# Patient Record
Sex: FEMALE | Race: WHITE | NOT HISPANIC OR LATINO | URBAN - METROPOLITAN AREA
[De-identification: names, ages, dates, MRNs, and addresses within clinical notes are randomized per-mention and may not be internally consistent; named-entity substitution may affect disease eponyms.]

---

## 2018-02-27 ENCOUNTER — OUTPATIENT (OUTPATIENT)
Dept: OUTPATIENT SERVICES | Facility: HOSPITAL | Age: 38
LOS: 1 days | Discharge: HOME | End: 2018-02-27

## 2018-02-27 DIAGNOSIS — R53.83 OTHER FATIGUE: ICD-10-CM

## 2018-02-27 DIAGNOSIS — E55.9 VITAMIN D DEFICIENCY, UNSPECIFIED: ICD-10-CM

## 2020-10-11 ENCOUNTER — TRANSCRIPTION ENCOUNTER (OUTPATIENT)
Age: 40
End: 2020-10-11

## 2021-01-27 ENCOUNTER — OUTPATIENT (OUTPATIENT)
Dept: OUTPATIENT SERVICES | Facility: HOSPITAL | Age: 41
LOS: 1 days | Discharge: HOME | End: 2021-01-27
Payer: MEDICAID

## 2021-01-27 DIAGNOSIS — Z12.31 ENCOUNTER FOR SCREENING MAMMOGRAM FOR MALIGNANT NEOPLASM OF BREAST: ICD-10-CM

## 2021-01-27 PROCEDURE — 77063 BREAST TOMOSYNTHESIS BI: CPT | Mod: 26

## 2021-01-27 PROCEDURE — 77067 SCR MAMMO BI INCL CAD: CPT | Mod: 26

## 2022-07-01 ENCOUNTER — INPATIENT (INPATIENT)
Facility: HOSPITAL | Age: 42
LOS: 2 days | Discharge: HOME | End: 2022-07-04
Attending: INTERNAL MEDICINE | Admitting: INTERNAL MEDICINE

## 2022-07-01 DIAGNOSIS — Z87.891 PERSONAL HISTORY OF NICOTINE DEPENDENCE: ICD-10-CM

## 2022-07-01 DIAGNOSIS — K80.20 CALCULUS OF GALLBLADDER WITHOUT CHOLECYSTITIS WITHOUT OBSTRUCTION: ICD-10-CM

## 2022-07-01 DIAGNOSIS — R73.03 PREDIABETES: ICD-10-CM

## 2022-07-01 DIAGNOSIS — Z79.84 LONG TERM (CURRENT) USE OF ORAL HYPOGLYCEMIC DRUGS: ICD-10-CM

## 2022-07-01 DIAGNOSIS — U07.1 COVID-19: ICD-10-CM

## 2022-07-01 DIAGNOSIS — I10 ESSENTIAL (PRIMARY) HYPERTENSION: ICD-10-CM

## 2022-07-01 DIAGNOSIS — K76.0 FATTY (CHANGE OF) LIVER, NOT ELSEWHERE CLASSIFIED: ICD-10-CM

## 2022-07-01 DIAGNOSIS — E66.9 OBESITY, UNSPECIFIED: ICD-10-CM

## 2022-07-01 DIAGNOSIS — Z90.5 ACQUIRED ABSENCE OF KIDNEY: ICD-10-CM

## 2022-07-01 DIAGNOSIS — R10.11 RIGHT UPPER QUADRANT PAIN: ICD-10-CM

## 2022-07-01 PROCEDURE — 99285 EMERGENCY DEPT VISIT HI MDM: CPT

## 2022-07-02 VITALS
RESPIRATION RATE: 18 BRPM | HEART RATE: 85 BPM | HEIGHT: 66 IN | WEIGHT: 199.96 LBS | SYSTOLIC BLOOD PRESSURE: 192 MMHG | TEMPERATURE: 98 F | OXYGEN SATURATION: 99 % | DIASTOLIC BLOOD PRESSURE: 117 MMHG

## 2022-07-02 LAB
ALBUMIN SERPL ELPH-MCNC: 4.1 G/DL — SIGNIFICANT CHANGE UP (ref 3.5–5.2)
ALP SERPL-CCNC: 146 U/L — HIGH (ref 30–115)
ALT FLD-CCNC: 435 U/L — HIGH (ref 0–41)
ANION GAP SERPL CALC-SCNC: 12 MMOL/L — SIGNIFICANT CHANGE UP (ref 7–14)
APPEARANCE UR: CLEAR — SIGNIFICANT CHANGE UP
AST SERPL-CCNC: 374 U/L — HIGH (ref 0–41)
BASOPHILS # BLD AUTO: 0.05 K/UL — SIGNIFICANT CHANGE UP (ref 0–0.2)
BASOPHILS NFR BLD AUTO: 0.5 % — SIGNIFICANT CHANGE UP (ref 0–1)
BILIRUB SERPL-MCNC: 1.2 MG/DL — SIGNIFICANT CHANGE UP (ref 0.2–1.2)
BILIRUB UR-MCNC: NEGATIVE — SIGNIFICANT CHANGE UP
BUN SERPL-MCNC: 14 MG/DL — SIGNIFICANT CHANGE UP (ref 10–20)
CALCIUM SERPL-MCNC: 9 MG/DL — SIGNIFICANT CHANGE UP (ref 8.5–10.1)
CHLORIDE SERPL-SCNC: 103 MMOL/L — SIGNIFICANT CHANGE UP (ref 98–110)
CO2 SERPL-SCNC: 24 MMOL/L — SIGNIFICANT CHANGE UP (ref 17–32)
COLOR SPEC: SIGNIFICANT CHANGE UP
CREAT SERPL-MCNC: 0.9 MG/DL — SIGNIFICANT CHANGE UP (ref 0.7–1.5)
DIFF PNL FLD: NEGATIVE — SIGNIFICANT CHANGE UP
EGFR: 82 ML/MIN/1.73M2 — SIGNIFICANT CHANGE UP
EOSINOPHIL # BLD AUTO: 0.08 K/UL — SIGNIFICANT CHANGE UP (ref 0–0.7)
EOSINOPHIL NFR BLD AUTO: 0.8 % — SIGNIFICANT CHANGE UP (ref 0–8)
FLUAV AG NPH QL: SIGNIFICANT CHANGE UP
FLUBV AG NPH QL: SIGNIFICANT CHANGE UP
GLUCOSE SERPL-MCNC: 172 MG/DL — HIGH (ref 70–99)
GLUCOSE UR QL: NEGATIVE — SIGNIFICANT CHANGE UP
HCG SERPL QL: NEGATIVE — SIGNIFICANT CHANGE UP
HCT VFR BLD CALC: 36.7 % — LOW (ref 37–47)
HGB BLD-MCNC: 12.7 G/DL — SIGNIFICANT CHANGE UP (ref 12–16)
IMM GRANULOCYTES NFR BLD AUTO: 0.7 % — HIGH (ref 0.1–0.3)
KETONES UR-MCNC: NEGATIVE — SIGNIFICANT CHANGE UP
LACTATE SERPL-SCNC: 1.9 MMOL/L — SIGNIFICANT CHANGE UP (ref 0.7–2)
LEUKOCYTE ESTERASE UR-ACNC: NEGATIVE — SIGNIFICANT CHANGE UP
LIDOCAIN IGE QN: 69 U/L — HIGH (ref 7–60)
LYMPHOCYTES # BLD AUTO: 2.32 K/UL — SIGNIFICANT CHANGE UP (ref 1.2–3.4)
LYMPHOCYTES # BLD AUTO: 23.7 % — SIGNIFICANT CHANGE UP (ref 20.5–51.1)
MCHC RBC-ENTMCNC: 31.4 PG — HIGH (ref 27–31)
MCHC RBC-ENTMCNC: 34.6 G/DL — SIGNIFICANT CHANGE UP (ref 32–37)
MCV RBC AUTO: 90.8 FL — SIGNIFICANT CHANGE UP (ref 81–99)
MONOCYTES # BLD AUTO: 0.75 K/UL — HIGH (ref 0.1–0.6)
MONOCYTES NFR BLD AUTO: 7.7 % — SIGNIFICANT CHANGE UP (ref 1.7–9.3)
NEUTROPHILS # BLD AUTO: 6.53 K/UL — HIGH (ref 1.4–6.5)
NEUTROPHILS NFR BLD AUTO: 66.6 % — SIGNIFICANT CHANGE UP (ref 42.2–75.2)
NITRITE UR-MCNC: NEGATIVE — SIGNIFICANT CHANGE UP
NRBC # BLD: 0 /100 WBCS — SIGNIFICANT CHANGE UP (ref 0–0)
PH UR: 7 — SIGNIFICANT CHANGE UP (ref 5–8)
PLATELET # BLD AUTO: 336 K/UL — SIGNIFICANT CHANGE UP (ref 130–400)
POTASSIUM SERPL-MCNC: 3.8 MMOL/L — SIGNIFICANT CHANGE UP (ref 3.5–5)
POTASSIUM SERPL-SCNC: 3.8 MMOL/L — SIGNIFICANT CHANGE UP (ref 3.5–5)
PROT SERPL-MCNC: 6.4 G/DL — SIGNIFICANT CHANGE UP (ref 6–8)
PROT UR-MCNC: NEGATIVE — SIGNIFICANT CHANGE UP
RBC # BLD: 4.04 M/UL — LOW (ref 4.2–5.4)
RBC # FLD: 12.5 % — SIGNIFICANT CHANGE UP (ref 11.5–14.5)
RSV RNA NPH QL NAA+NON-PROBE: SIGNIFICANT CHANGE UP
SARS-COV-2 RNA SPEC QL NAA+PROBE: DETECTED
SODIUM SERPL-SCNC: 139 MMOL/L — SIGNIFICANT CHANGE UP (ref 135–146)
SP GR SPEC: 1.01 — LOW (ref 1.01–1.03)
TROPONIN T SERPL-MCNC: <0.01 NG/ML — SIGNIFICANT CHANGE UP
UROBILINOGEN FLD QL: SIGNIFICANT CHANGE UP
WBC # BLD: 9.8 K/UL — SIGNIFICANT CHANGE UP (ref 4.8–10.8)
WBC # FLD AUTO: 9.8 K/UL — SIGNIFICANT CHANGE UP (ref 4.8–10.8)

## 2022-07-02 PROCEDURE — 74176 CT ABD & PELVIS W/O CONTRAST: CPT | Mod: 26,MA

## 2022-07-02 PROCEDURE — 99223 1ST HOSP IP/OBS HIGH 75: CPT

## 2022-07-02 PROCEDURE — 76705 ECHO EXAM OF ABDOMEN: CPT | Mod: 26

## 2022-07-02 PROCEDURE — 93010 ELECTROCARDIOGRAM REPORT: CPT

## 2022-07-02 PROCEDURE — 99233 SBSQ HOSP IP/OBS HIGH 50: CPT

## 2022-07-02 RX ORDER — SODIUM CHLORIDE 9 MG/ML
2800 INJECTION, SOLUTION INTRAVENOUS ONCE
Refills: 0 | Status: DISCONTINUED | OUTPATIENT
Start: 2022-07-02 | End: 2022-07-02

## 2022-07-02 RX ORDER — METFORMIN HYDROCHLORIDE 850 MG/1
1 TABLET ORAL
Qty: 0 | Refills: 0 | DISCHARGE

## 2022-07-02 RX ORDER — SODIUM CHLORIDE 9 MG/ML
1000 INJECTION, SOLUTION INTRAVENOUS ONCE
Refills: 0 | Status: COMPLETED | OUTPATIENT
Start: 2022-07-02 | End: 2022-07-02

## 2022-07-02 RX ORDER — SODIUM CHLORIDE 9 MG/ML
1000 INJECTION INTRAMUSCULAR; INTRAVENOUS; SUBCUTANEOUS
Refills: 0 | Status: DISCONTINUED | OUTPATIENT
Start: 2022-07-02 | End: 2022-07-04

## 2022-07-02 RX ORDER — IBUPROFEN 200 MG
400 TABLET ORAL EVERY 6 HOURS
Refills: 0 | Status: DISCONTINUED | OUTPATIENT
Start: 2022-07-02 | End: 2022-07-04

## 2022-07-02 RX ORDER — ENOXAPARIN SODIUM 100 MG/ML
40 INJECTION SUBCUTANEOUS EVERY 24 HOURS
Refills: 0 | Status: DISCONTINUED | OUTPATIENT
Start: 2022-07-02 | End: 2022-07-04

## 2022-07-02 RX ORDER — KETOROLAC TROMETHAMINE 30 MG/ML
15 SYRINGE (ML) INJECTION EVERY 6 HOURS
Refills: 0 | Status: DISCONTINUED | OUTPATIENT
Start: 2022-07-02 | End: 2022-07-04

## 2022-07-02 RX ORDER — ONDANSETRON 8 MG/1
4 TABLET, FILM COATED ORAL ONCE
Refills: 0 | Status: COMPLETED | OUTPATIENT
Start: 2022-07-02 | End: 2022-07-02

## 2022-07-02 RX ORDER — LOSARTAN POTASSIUM 100 MG/1
1 TABLET, FILM COATED ORAL
Qty: 0 | Refills: 0 | DISCHARGE

## 2022-07-02 RX ORDER — MORPHINE SULFATE 50 MG/1
4 CAPSULE, EXTENDED RELEASE ORAL ONCE
Refills: 0 | Status: DISCONTINUED | OUTPATIENT
Start: 2022-07-02 | End: 2022-07-02

## 2022-07-02 RX ORDER — LANOLIN ALCOHOL/MO/W.PET/CERES
3 CREAM (GRAM) TOPICAL AT BEDTIME
Refills: 0 | Status: DISCONTINUED | OUTPATIENT
Start: 2022-07-02 | End: 2022-07-04

## 2022-07-02 RX ORDER — ONDANSETRON 8 MG/1
4 TABLET, FILM COATED ORAL EVERY 6 HOURS
Refills: 0 | Status: DISCONTINUED | OUTPATIENT
Start: 2022-07-02 | End: 2022-07-04

## 2022-07-02 RX ORDER — PANTOPRAZOLE SODIUM 20 MG/1
40 TABLET, DELAYED RELEASE ORAL
Refills: 0 | Status: DISCONTINUED | OUTPATIENT
Start: 2022-07-02 | End: 2022-07-03

## 2022-07-02 RX ADMIN — MORPHINE SULFATE 4 MILLIGRAM(S): 50 CAPSULE, EXTENDED RELEASE ORAL at 01:24

## 2022-07-02 RX ADMIN — ONDANSETRON 4 MILLIGRAM(S): 8 TABLET, FILM COATED ORAL at 01:24

## 2022-07-02 RX ADMIN — SODIUM CHLORIDE 75 MILLILITER(S): 9 INJECTION INTRAMUSCULAR; INTRAVENOUS; SUBCUTANEOUS at 23:34

## 2022-07-02 RX ADMIN — SODIUM CHLORIDE 1000 MILLILITER(S): 9 INJECTION, SOLUTION INTRAVENOUS at 01:24

## 2022-07-02 NOTE — H&P ADULT - NSHPPHYSICALEXAM_GEN_ALL_CORE
General: NAD, lying in bed comfortably  Mental status: AAOx3  Head:  Atraumatic, Normocephalic  Eyes: EOMI, conjunctiva and sclera clear  ENT: Moist mucous membranes  Neck: No JVD  Chest/lung: Clear to auscultation bilaterally; No wheezing or crackles  Heart: Regular rate and rhythm; No murmurs, rubs, or gallops  Abdomen: Bowel sounds present; Soft, Nondistended. No tenderness RUQ  Extremities:  2+ Peripheral Pulses. No edema or cyanosis

## 2022-07-02 NOTE — ED PROVIDER NOTE - NS ED ATTENDING STATEMENT MOD
----- Message from Urbano Ku MD sent at 9/18/2019  6:20 PM CDT -----  Patient can be notified results are within normal limits and no adjustments or corrections need to be made at this time.   Attending with

## 2022-07-02 NOTE — ED PROVIDER NOTE - PROGRESS NOTE DETAILS
ED Attending ARIES Laura  Patient and family aware of all results, alk phos 146, , , lipase 69, ultrasound showing cholelithiasis with a dilated CBD of 7 mm, recommendation for MRCP.  Patient aware of plan for admission for GI consult and further evaluation and treatment.  Agrees with plan.  Medical admitting team aware of patient and admission.  Report will get GI consulted.  Admitted as discussed with patient.

## 2022-07-02 NOTE — ED PROVIDER NOTE - OBJECTIVE STATEMENT
42 yo female with no pmh presenting to the ED for abdominal pain. States that it started a few days ago, resolved, then returned today - this time worse. Pain is localized to the RUQ, not relieved by any medications. States that she has never had pain like this before. Also states she tested positive for covid two weeks ago. Denies eating any new foods.

## 2022-07-02 NOTE — PATIENT PROFILE ADULT - FALL HARM RISK - HARM RISK INTERVENTIONS

## 2022-07-02 NOTE — ED PROVIDER NOTE - CARE PLAN
Assessment and plan of treatment:	Plan: Labs, ivf, pain control, urine, imaging, reassess.   1 Principal Discharge DX:	Abdominal pain  Assessment and plan of treatment:	Plan: Labs, ivf, pain control, urine, imaging, reassess.  Secondary Diagnosis:	Dilation of common bile duct

## 2022-07-02 NOTE — H&P ADULT - ASSESSMENT
41F with medical history of L nephrectomy for kidney donation, pre diabetes, tested positive for COVID outpatient 2 weeks (mild infection with cough only did not need further medical care, vaccinated and boosted) ago presents with RUQ abdominal pain x1 day, found to have cholelithiasis, CBD dilation, and elevated liver enzymes.    #Cholelithiasis, suspected choledocholithiasis  - CTAP noncon: CBD 8 mm, hepatic steatosis, s/p left nephrectomy, no acute pathology  - Afebrile, no leukocytosis No evidence of cholecystitis or cholangitis  - MRCP  - Pain control PRN (currently pain free) 41F with medical history of L nephrectomy for kidney donation, pre diabetes, tested positive for COVID outpatient 2 weeks (mild infection with cough only did not need further medical care, vaccinated and boosted) ago presents with RUQ abdominal pain x1 day, found to have cholelithiasis, CBD dilation, and elevated liver enzymes.    #Cholelithiasis, suspected choledocholithiasis  - RUQ US: Cholelithiasis, no cholecystitis  - CTAP noncon: CBD 8 mm, hepatic steatosis, s/p left nephrectomy, no acute pathology  - Afebrile, no leukocytosis No evidence of cholecystitis or cholangitis. Lipase mildly elevated, no evidence of gallstone pancreatitis  - MRCP  - NSAIDs PRN (currently pain free)  - NPO for now pending MRCP. If MRCP delayed, can feed as tolerated  - GI consult    #Transaminitis  - Alk phos, ALT, AST elevation in a hepatocellular pattern; T bili normal  - Possibly component of choledocholithiasis + COVID hepatitis  - Check viral hepatitis panel, f/u GI    #COVID-19 infection, asymptomatic  - Likely resolved, tested positive 2 weeks ago  - No indication for treatment, monitor    #HTN  #Pre-DM  - Resume home losartan 50 mg  - If FS persistently elevated can start basal bolus insulin    #Hx of left nephrectomy- outpt f/u    DVT Prophylaxis: Lovenox   GI Prophylaxis: Protonix PO  Diet: NPO for now   41F with medical history of L nephrectomy for kidney donation, pre diabetes, tested positive for COVID outpatient 2 weeks (mild infection with cough only did not need further medical care, vaccinated and boosted) ago presents with RUQ abdominal pain x1 day, found to have cholelithiasis, CBD dilation, and elevated liver enzymes.    #Cholelithiasis, suspected choledocholithiasis  - RUQ US: Cholelithiasis, no cholecystitis  - CTAP noncon: CBD 8 mm, hepatic steatosis, s/p left nephrectomy, no acute pathology  - Afebrile, no leukocytosis No evidence of cholecystitis or cholangitis. Lipase mildly elevated, no evidence of gallstone pancreatitis  - MRCP  - NSAIDs PRN (currently pain free)  - NPO for now pending MRCP. If MRCP delayed, can feed as tolerated  - GI consult    #Transaminitis  - Alk phos, ALT, AST elevation in a hepatocellular pattern; T bili normal  - Possibly component of choledocholithiasis + viral hepatitis (recent COVID infection)  - Check viral hepatitis panel, f/u GI    #COVID-19 infection, asymptomatic  - Likely resolved, tested positive 2 weeks ago  - No indication for treatment, monitor    #HTN  #Pre-DM  - Resume home losartan 50 mg  - If FS persistently elevated can start basal bolus insulin    #Hx of left nephrectomy- outpt f/u    DVT Prophylaxis: Lovenox   GI Prophylaxis: Protonix PO  Diet: NPO for now

## 2022-07-02 NOTE — CONSULT NOTE ADULT - SUBJECTIVE AND OBJECTIVE BOX
Gastroenterology Consultation:    Patient is a 41y old  Female who presents with a chief complaint of Abdominal pain (02 Jul 2022 11:49)      Admitted on: 07-02-22  HPI:  41F with medical history of L nephrectomy for kidney donation to her mother 17 y ago, pre diabetes, tested positive for COVID outpatient 2 weeks (mild infection with cough only did not need further medical care, vaccinated and boosted) ago presents with RUQ abdominal pain since last night. Pain is sharp/crampy, constant, no relieving or exacerbating factors, not related to food intake. She had the same pain on Wednesday, resolved by itself, then recurred last night and did not resolve until she received morphine in the hospital.     She denies history of gallbladder stones or similar pain, nausea, vomiting, fever, chills, pruritus, yellowing of the skin or eyes, back pain, diarrhea or constipation, chest pain, SOB, cough, drug or herbal supplement use.    ED VS: T(F): , Max: 98.2  HR:  (77 - 85) BP:  (129/82 - 166/77) RR: 18 SpO2:  (97% - 99%)    Labs: Alk phos 146, , , T bili 1.2, lipase 69. COVID PCR + RUQ US Cholelithiasis without sonographic evidence for acute cholecystitis. Dilated CBD 7 mm  CTAP noncon: CBD 8 mm, hepatic steatosis, s/p left nephrectomy, no acute pathology    pain resolved completely after morphine     Prior EGD: never   Prior Colonoscopy: never       PAST MEDICAL & SURGICAL HISTORY:  S/p nephrectomy  HTN (hypertension)  DM on metfromin     FAMILY HISTORY: kidney failure mother. CCY mother       Social History:  Tobacco: n  Alcohol: n  Drugs: n    Home Medications:  losartan 50 mg oral tablet: 1 tab(s) orally once a day (02 Jul 2022 11:37)  metFORMIN 500 mg oral tablet: 1 tab(s) orally once a day (02 Jul 2022 11:37)    MEDICATIONS  (STANDING):  enoxaparin Injectable 40 milliGRAM(s) SubCutaneous every 24 hours  pantoprazole    Tablet 40 milliGRAM(s) Oral before breakfast  sodium chloride 0.9%. 1000 milliLiter(s) (75 mL/Hr) IV Continuous <Continuous>    MEDICATIONS  (PRN):  ibuprofen  Tablet. 400 milliGRAM(s) Oral every 6 hours PRN Moderate Pain (4 - 6)  ketorolac   Injectable 15 milliGRAM(s) IV Push every 6 hours PRN Severe Pain (7 - 10)  melatonin 3 milliGRAM(s) Oral at bedtime PRN Insomnia  ondansetron Injectable 4 milliGRAM(s) IV Push every 6 hours PRN Nausea and/or Vomiting      Allergies  No Known Allergies      Review of Systems:   Constitutional:  No Fever, No Chills  ENT/Mouth:  No Hearing Changes,  No Difficulty Swallowing  Eyes:  No Eye Pain, No Vision Changes  Cardiovascular:  No Chest Pain, No Palpitations  Respiratory:  No Cough, No Dyspnea  Gastrointestinal:  As described in HPI  Musculoskeletal:  No Joint Swelling, No Back Pain  Skin:  No Skin Lesions, No Jaundice  Neuro:  No Syncope, No Dizziness  Heme/Lymph:  No Bruising, No Bleeding.     Physical Examination:  T(C): 36.4 (07-02-22 @ 08:50), Max: 36.8 (07-02-22 @ 00:06)  HR: 77 (07-02-22 @ 08:50) (77 - 85)  BP: 129/82 (07-02-22 @ 08:50) (129/82 - 166/77)  RR: 18 (07-02-22 @ 08:50) (18 - 18)  SpO2: 97% (07-02-22 @ 08:50) (97% - 99%)  Height (cm): 167.6 (07-02-22 @ 00:06)  Weight (kg): 90.7 (07-02-22 @ 00:06)      Constitutional: No acute distress.  Eyes:. Conjunctivae are clear, Sclera is non-icteric.  Ears Nose and Throat: The external ears are normal appearing,  Oral mucosa is pink and moist.  Respiratory:  No signs of respiratory distress. Lung sounds are clear bilaterally.  Cardiovascular:  S1 S2, Regular rate and rhythm.  GI: Abdomen is soft, symmetric, and non-tender without distention.   Neuro: AO*3, no asterixis  Skin: No rashes, No Jaundice.     Data: (reviewed by attending)                        12.7   9.80  )-----------( 336      ( 02 Jul 2022 01:10 )             36.7     Hgb Trend:  12.7  07-02-22 @ 01:10      07-02    139  |  103  |  14  ----------------------------<  172<H>  3.8   |  24  |  0.9    Ca    9.0      02 Jul 2022 01:10    TPro  6.4  /  Alb  4.1  /  TBili  1.2  /  DBili  x   /  AST  374<H>  /  ALT  435<H>  /  AlkPhos  146<H>  07-02    Liver panel trend:  TBili 1.2   /      /      /   AlkP 146   /   Tptn 6.4   /   Alb 4.1    /   DBili --      07-02              Radiology:(reviewed by attending)  CT Abdomen and Pelvis No Cont:   ACC: [id number] EXAM:  CT ABDOMEN AND PELVIS                          PROCEDURE DATE:  07/02/2022          INTERPRETATION:  CLINICAL STATEMENT: Abdominal pain.    TECHNIQUE: Contiguous axial CT images were obtained from the lower chest   to the pubicsymphysis without intravenous contrast.  Oral contrast was   not administered.  Reformatted images in the coronal and sagittal planes   were acquired.    COMPARISON CT: CT abdomen pelvis dated 7/9/2006      FINDINGS:    LOWER CHEST: Mild bibasilar subsegmental atelectasis.    HEPATOBILIARY: Hepatic steatosis. Dilated CBD to 8 mm.    SPLEEN: Unremarkable.    PANCREAS: Unremarkable.    ADRENAL GLANDS: Unremarkable.    KIDNEYS: Post left nephrectomy. No right hydronephrosis.    ABDOMINOPELVIC NODES:Unremarkable.    PELVIC ORGANS: Unremarkable.    PERITONEUM/MESENTERY/BOWEL: Normal appendix. No bowel obstruction,   ascites or pneumoperitoneum.    BONES/SOFT TISSUES: No acute osseous abnormality.      IMPRESSION:    CBD dilatation to 8 mm. Can consider MRCP for further evaluation.    Hepatic steatosis.    Otherwise no CT evidence for acute intra-abdominal pathology.    --- End of Report ---          ISABELA FUNK MD; Resident Radiologist  This document has been electronically signed.  DEMETRA ESCOBAR MD; Attending Radiologist  This document has been electronically signed. Jul 2 2022  7:58AM (07-02-22 @ 03:57)    US Abdomen Upper Quadrant Right:   ACC: [id number] EXAM:  US ABDOMEN RT UPR QUADRANT                          PROCEDURE DATE:  07/02/2022          INTERPRETATION:  CLINICAL INFORMATION: Right upper quadrant abdominal   pain.    COMPARISON: None available.    TECHNIQUE: Sonography of the right upper quadrant.    FINDINGS:    Liver: Increased echogenicity.  Bile ducts: No intrahepatic biliary ductal dilatation. Common bile duct   measures 7 mm.  Gallbladder: Cholelithiasis. No appreciable gallbladder wall thickening,   pericholecystic fluid or reported sonographic Valdes's sign..  Pancreas: Visualized portions are within normal limits.  Right kidney: 13.8 cm. No hydronephrosis.  Ascites: None.  IVC: Visualized portions are within normal limits.    IMPRESSION:    Cholelithiasis without sonographic evidence for acute cholecystitis.    Dilated common bile duct measures 7 mm. Can consider MRCP for further   evaluation.    Hepatic steatosis.    --- End of Report ---          ISABELA FUNK MD; Resident Radiologist  This document hasbeen electronically signed.  DEMETRA ESCOBAR MD; Attending Radiologist  This document has been electronically signed. Jul 2 2022  4:00AM (07-02-22 @ 02:39)

## 2022-07-02 NOTE — ED PROVIDER NOTE - PHYSICAL EXAMINATION
CONSTITUTIONAL: Well-developed; well-nourished; pt appears uncomfortable, in pain.  SKIN: warm, dry. no rashes.  HEAD: Normocephalic; atraumatic.  EYES: PERRLA, EOMI, no conjunctival erythema.  ENT: No nasal discharge; airway clear. mmm.  NECK: Supple; non tender.  CARD: S1, S2 normal; no murmurs, gallops, or rubs. Regular rate and rhythm.   RESP: No wheezes, rales or rhonchi. lungs ctab.  ABD: soft, non-distended, +ttp to RUQ. +guarding. no CVAT b/l.  EXT: Normal ROM.  No clubbing, cyanosis or edema.  NEURO: Alert, oriented, grossly unremarkable. no focal neuro. deficits.

## 2022-07-02 NOTE — H&P ADULT - NSHPLABSRESULTS_GEN_ALL_CORE
LABS:  cret                        12.7   9.80  )-----------( 336      ( 02 Jul 2022 01:10 )             36.7     07-02    139  |  103  |  14  ----------------------------<  172<H>  3.8   |  24  |  0.9    Ca    9.0      02 Jul 2022 01:10    TPro  6.4  /  Alb  4.1  /  TBili  1.2  /  DBili  x   /  AST  374<H>  /  ALT  435<H>  /  AlkPhos  146<H>  07-02

## 2022-07-02 NOTE — CONSULT NOTE ADULT - ASSESSMENT
41F with medical history of L nephrectomy for kidney donation to her mother 17 y ago, pre diabetes, tested positive for COVID outpatient 2 weeks (mild infection with cough only did not need further medical care, vaccinated and boosted) ago presents with RUQ abdominal pain since last night. Pain is sharp/crampy, constant, no relieving or exacerbating factors, not related to food intake. She had the same pain on Wednesday, resolved by itself, then recurred last night and did not resolve until she received morphine in the hospital.     *Cholelithiasis  *Elevated Lfts,  hepatocellular pattern with R factor 8.4  -no fever no chills   -pain resolved   -RUQ US Cholelithiasis without sonographic evidence for acute cholecystitis. Dilated CBD 7 mm  -CTAP noncon: CBD 8 mm, hepatic steatosis, s/p left nephrectomy, no acute pathology    *COVID-19 positive  -per medicine: no indication for covid treatment---continue isolation precautions- monitor closely       Rec  -can have clear liquid diet  -MRCP  -Please check HAV IgM/IgG, HBsAg, HBsAb, HBcAb IgM/IgG, HCV Ab   -trend LFTs, check INR  -IV hydration  -pain control as needed  --For questions text me on [facility] teams, or call GI service at [phone] 41F with medical history of L nephrectomy for kidney donation to her mother 17 y ago, pre diabetes, tested positive for COVID outpatient 2 weeks (mild infection with cough only did not need further medical care, vaccinated and boosted) ago presents with RUQ abdominal pain since last night. Pain is sharp/crampy, constant, no relieving or exacerbating factors, not related to food intake. She had the same pain on Wednesday, resolved by itself, then recurred last night and did not resolve until she received morphine in the hospital.     *Cholelithiasis  *Elevated Lfts,  hepatocellular pattern with R factor 8.4  -no fever no chills   -pain resolved   -RUQ US Cholelithiasis without sonographic evidence for acute cholecystitis. Dilated CBD 7 mm  -CTAP noncon: CBD 8 mm, hepatic steatosis, s/p left nephrectomy, no acute pathology    *COVID-19 positive  -per medicine: no indication for covid treatment---continue isolation precautions- monitor closely       Rec  -can have clear liquid diet  -MRCP  -Please check HAV IgM/IgG, HBsAg, HBsAb, HBcAb IgM/IgG, HCV Ab   -trend LFTs, check INR  -IV hydration  -pain control as needed  -surgery evaluation    --For questions text me on Mc teams, or call GI service at [phone]

## 2022-07-02 NOTE — H&P ADULT - HISTORY OF PRESENT ILLNESS
41F with medical history of L nephrectomy for kidney donation, pre diabetes, tested positive for COVID outpatient 2 weeks ago presents with RUQ abdominal pain since last night. Pain is sharp, constant, no relieving or exacerbating factors, not related to food intake.     ED VS: T(F): , Max: 98.2  HR:  (77 - 85) BP:  (129/82 - 166/77) RR: 18 SpO2:  (97% - 99%)    Labs: Alk phos 146, , , T bili 1.2, lipase 69. COVID PCR + RUQ US Cholelithiasis without sonographic evidence for acute cholecystitis. Dilated CBD 7 mm  CTAP noncon: CBD 8 mm, hepatic steatosis, s/p left nephrectomy, no acute pathology    Pt received   Pt is admitted for workup/management   41F with medical history of L nephrectomy for kidney donation to her mother 17 y ago, pre diabetes, tested positive for COVID outpatient 2 weeks (mild infection with cough only did not need further medical care, vaccinated and boosted) ago presents with RUQ abdominal pain since last night. Pain is sharp/crampy, constant, no relieving or exacerbating factors, not related to food intake. She had the same pain on Wednesday, resolved by itself, then recurred last night and did not resolve until she received morphine in the hospital.     She denies history of gallbladder stones or similar pain, nausea, vomiting, fever, chills, pruritus, yellowing of the skin or eyes, back pain, diarrhea or constipation, chest pain, SOB, cough, drug or herbal supplement use.    ED VS: T(F): , Max: 98.2  HR:  (77 - 85) BP:  (129/82 - 166/77) RR: 18 SpO2:  (97% - 99%)    Labs: Alk phos 146, , , T bili 1.2, lipase 69. COVID PCR + RUQ US Cholelithiasis without sonographic evidence for acute cholecystitis. Dilated CBD 7 mm  CTAP noncon: CBD 8 mm, hepatic steatosis, s/p left nephrectomy, no acute pathology    Pt received LR bolus, morphine with resolution of pain  Pt is admitted for workup/management of cholecystitis/suspected choledocholithiasis

## 2022-07-02 NOTE — H&P ADULT - NSHPREVIEWOFSYSTEMS_GEN_ALL_CORE
CONSTITUTIONAL: No weakness, fevers or chills, no weight loss   EYES/ENT: No visual changes;  No vertigo or throat pain   NECK: No pain or stiffness  RESPIRATORY: No cough, wheezing, hemoptysis; No shortness of breath  CARDIOVASCULAR: No chest pain or palpitations  GASTROINTESTINAL: + RUQ pain, No nausea, vomiting, or hematemesis; No diarrhea or constipation. No melena or hematochezia.  GENITOURINARY: No dysuria, frequency or hematuria  NEUROLOGICAL: No numbness or weakness  All other review of systems is negative unless indicated above.

## 2022-07-02 NOTE — H&P ADULT - ATTENDING COMMENTS
patient seen and examined independently on morning rounds for the first time today in the ED, chart reviewed and discussed with the medicine resident and on interdisciplinary rounds and agree with the above overnight h/p with the following addendum:    in brief, 42 yo woman with h/o nephrectomy (donated kidney to mother many years ago) and recent covid infection (2 weeks ago) who p/w RUQ abdominal pain and found to have cholelithiasis with dilated cbd on US.  no h/o recent travel or sick contacts.  by the time I performed exam abdominal pain was improving LMP 6/19/22-no h/o etoh or drug use- no recent travel    mother present bedside  psurg hx-  nephrectomy    ROS- as per above otherwise review of systems unremarkable    PE:  GEN-NAD, AAOx3  HEENT- NCAT, PERRLA, EOMI  PULM- Clear to auscultation bilaterally, fair air entry  CVS- +s1/s2 RRR no murmurs  GI- soft NT ND +bs, no rebound, no guarding  EXT- no edema    labs/radiology reviewed--lipase 69  covid +    a/p:  #abdominal pain- cholelithiasis with dilated CBD and transaminitis  #covid-19 infection  -lipase 69  -pain control  -GI consult  -pending MRCP  -trend lft and check viral hepatitis plan  -no indication for covid treatment---continue isolation precautions- monitor closely  -check hba1c    DVT/GI ppx  guarded prognosis    FULL CODE    anticipate likely dc home after mrcp completed within next 24-48 hrs

## 2022-07-03 LAB
ALBUMIN SERPL ELPH-MCNC: 4.1 G/DL — SIGNIFICANT CHANGE UP (ref 3.5–5.2)
ALP SERPL-CCNC: 164 U/L — HIGH (ref 30–115)
ALT FLD-CCNC: 437 U/L — HIGH (ref 0–41)
ANION GAP SERPL CALC-SCNC: 14 MMOL/L — SIGNIFICANT CHANGE UP (ref 7–14)
AST SERPL-CCNC: 192 U/L — HIGH (ref 0–41)
BASOPHILS # BLD AUTO: 0.05 K/UL — SIGNIFICANT CHANGE UP (ref 0–0.2)
BASOPHILS NFR BLD AUTO: 0.6 % — SIGNIFICANT CHANGE UP (ref 0–1)
BILIRUB SERPL-MCNC: 1 MG/DL — SIGNIFICANT CHANGE UP (ref 0.2–1.2)
BUN SERPL-MCNC: 7 MG/DL — LOW (ref 10–20)
CALCIUM SERPL-MCNC: 8.8 MG/DL — SIGNIFICANT CHANGE UP (ref 8.5–10.1)
CHLORIDE SERPL-SCNC: 105 MMOL/L — SIGNIFICANT CHANGE UP (ref 98–110)
CO2 SERPL-SCNC: 21 MMOL/L — SIGNIFICANT CHANGE UP (ref 17–32)
CREAT SERPL-MCNC: 0.6 MG/DL — LOW (ref 0.7–1.5)
CULTURE RESULTS: SIGNIFICANT CHANGE UP
EGFR: 115 ML/MIN/1.73M2 — SIGNIFICANT CHANGE UP
EOSINOPHIL # BLD AUTO: 0.12 K/UL — SIGNIFICANT CHANGE UP (ref 0–0.7)
EOSINOPHIL NFR BLD AUTO: 1.4 % — SIGNIFICANT CHANGE UP (ref 0–8)
GLUCOSE SERPL-MCNC: 112 MG/DL — HIGH (ref 70–99)
HAV IGM SER-ACNC: SIGNIFICANT CHANGE UP
HBV CORE IGM SER-ACNC: SIGNIFICANT CHANGE UP
HBV SURFACE AG SER-ACNC: SIGNIFICANT CHANGE UP
HCT VFR BLD CALC: 38.4 % — SIGNIFICANT CHANGE UP (ref 37–47)
HCV AB S/CO SERPL IA: 0.14 S/CO — SIGNIFICANT CHANGE UP (ref 0–0.99)
HCV AB SERPL-IMP: SIGNIFICANT CHANGE UP
HGB BLD-MCNC: 13.2 G/DL — SIGNIFICANT CHANGE UP (ref 12–16)
IMM GRANULOCYTES NFR BLD AUTO: 0.5 % — HIGH (ref 0.1–0.3)
INR BLD: 1.51 RATIO — HIGH (ref 0.65–1.3)
LYMPHOCYTES # BLD AUTO: 2.13 K/UL — SIGNIFICANT CHANGE UP (ref 1.2–3.4)
LYMPHOCYTES # BLD AUTO: 24.5 % — SIGNIFICANT CHANGE UP (ref 20.5–51.1)
MAGNESIUM SERPL-MCNC: 1.8 MG/DL — SIGNIFICANT CHANGE UP (ref 1.8–2.4)
MCHC RBC-ENTMCNC: 31.3 PG — HIGH (ref 27–31)
MCHC RBC-ENTMCNC: 34.4 G/DL — SIGNIFICANT CHANGE UP (ref 32–37)
MCV RBC AUTO: 91 FL — SIGNIFICANT CHANGE UP (ref 81–99)
MONOCYTES # BLD AUTO: 0.67 K/UL — HIGH (ref 0.1–0.6)
MONOCYTES NFR BLD AUTO: 7.7 % — SIGNIFICANT CHANGE UP (ref 1.7–9.3)
NEUTROPHILS # BLD AUTO: 5.68 K/UL — SIGNIFICANT CHANGE UP (ref 1.4–6.5)
NEUTROPHILS NFR BLD AUTO: 65.3 % — SIGNIFICANT CHANGE UP (ref 42.2–75.2)
NRBC # BLD: 0 /100 WBCS — SIGNIFICANT CHANGE UP (ref 0–0)
PLATELET # BLD AUTO: 347 K/UL — SIGNIFICANT CHANGE UP (ref 130–400)
POTASSIUM SERPL-MCNC: 4.1 MMOL/L — SIGNIFICANT CHANGE UP (ref 3.5–5)
POTASSIUM SERPL-SCNC: 4.1 MMOL/L — SIGNIFICANT CHANGE UP (ref 3.5–5)
PROT SERPL-MCNC: 6.3 G/DL — SIGNIFICANT CHANGE UP (ref 6–8)
PROTHROM AB SERPL-ACNC: 17.3 SEC — HIGH (ref 9.95–12.87)
RBC # BLD: 4.22 M/UL — SIGNIFICANT CHANGE UP (ref 4.2–5.4)
RBC # FLD: 12.9 % — SIGNIFICANT CHANGE UP (ref 11.5–14.5)
SODIUM SERPL-SCNC: 140 MMOL/L — SIGNIFICANT CHANGE UP (ref 135–146)
SPECIMEN SOURCE: SIGNIFICANT CHANGE UP
WBC # BLD: 8.69 K/UL — SIGNIFICANT CHANGE UP (ref 4.8–10.8)
WBC # FLD AUTO: 8.69 K/UL — SIGNIFICANT CHANGE UP (ref 4.8–10.8)

## 2022-07-03 PROCEDURE — 74181 MRI ABDOMEN W/O CONTRAST: CPT | Mod: 26

## 2022-07-03 PROCEDURE — 99233 SBSQ HOSP IP/OBS HIGH 50: CPT

## 2022-07-03 RX ORDER — ACETAMINOPHEN 500 MG
650 TABLET ORAL ONCE
Refills: 0 | Status: COMPLETED | OUTPATIENT
Start: 2022-07-03 | End: 2022-07-03

## 2022-07-03 RX ORDER — PANTOPRAZOLE SODIUM 20 MG/1
40 TABLET, DELAYED RELEASE ORAL ONCE
Refills: 0 | Status: COMPLETED | OUTPATIENT
Start: 2022-07-03 | End: 2022-07-03

## 2022-07-03 RX ADMIN — ENOXAPARIN SODIUM 40 MILLIGRAM(S): 100 INJECTION SUBCUTANEOUS at 06:56

## 2022-07-03 RX ADMIN — Medication 650 MILLIGRAM(S): at 11:45

## 2022-07-03 RX ADMIN — PANTOPRAZOLE SODIUM 40 MILLIGRAM(S): 20 TABLET, DELAYED RELEASE ORAL at 08:51

## 2022-07-03 RX ADMIN — Medication 650 MILLIGRAM(S): at 10:33

## 2022-07-04 ENCOUNTER — TRANSCRIPTION ENCOUNTER (OUTPATIENT)
Age: 42
End: 2022-07-04

## 2022-07-04 VITALS
DIASTOLIC BLOOD PRESSURE: 85 MMHG | OXYGEN SATURATION: 100 % | TEMPERATURE: 98 F | RESPIRATION RATE: 18 BRPM | HEART RATE: 80 BPM | SYSTOLIC BLOOD PRESSURE: 165 MMHG

## 2022-07-04 LAB
ALBUMIN SERPL ELPH-MCNC: 4.1 G/DL — SIGNIFICANT CHANGE UP (ref 3.5–5.2)
ALP SERPL-CCNC: 136 U/L — HIGH (ref 30–115)
ALT FLD-CCNC: 282 U/L — HIGH (ref 0–41)
ANION GAP SERPL CALC-SCNC: 11 MMOL/L — SIGNIFICANT CHANGE UP (ref 7–14)
AST SERPL-CCNC: 61 U/L — HIGH (ref 0–41)
BASOPHILS # BLD AUTO: 0.05 K/UL — SIGNIFICANT CHANGE UP (ref 0–0.2)
BASOPHILS NFR BLD AUTO: 0.5 % — SIGNIFICANT CHANGE UP (ref 0–1)
BILIRUB SERPL-MCNC: 0.6 MG/DL — SIGNIFICANT CHANGE UP (ref 0.2–1.2)
BUN SERPL-MCNC: 10 MG/DL — SIGNIFICANT CHANGE UP (ref 10–20)
CALCIUM SERPL-MCNC: 9.3 MG/DL — SIGNIFICANT CHANGE UP (ref 8.5–10.1)
CHLORIDE SERPL-SCNC: 106 MMOL/L — SIGNIFICANT CHANGE UP (ref 98–110)
CO2 SERPL-SCNC: 25 MMOL/L — SIGNIFICANT CHANGE UP (ref 17–32)
CREAT SERPL-MCNC: 0.7 MG/DL — SIGNIFICANT CHANGE UP (ref 0.7–1.5)
EGFR: 111 ML/MIN/1.73M2 — SIGNIFICANT CHANGE UP
EOSINOPHIL # BLD AUTO: 0.14 K/UL — SIGNIFICANT CHANGE UP (ref 0–0.7)
EOSINOPHIL NFR BLD AUTO: 1.4 % — SIGNIFICANT CHANGE UP (ref 0–8)
GLUCOSE SERPL-MCNC: 141 MG/DL — HIGH (ref 70–99)
HCT VFR BLD CALC: 39.8 % — SIGNIFICANT CHANGE UP (ref 37–47)
HGB BLD-MCNC: 13.5 G/DL — SIGNIFICANT CHANGE UP (ref 12–16)
IMM GRANULOCYTES NFR BLD AUTO: 0.4 % — HIGH (ref 0.1–0.3)
LYMPHOCYTES # BLD AUTO: 2.41 K/UL — SIGNIFICANT CHANGE UP (ref 1.2–3.4)
LYMPHOCYTES # BLD AUTO: 24.8 % — SIGNIFICANT CHANGE UP (ref 20.5–51.1)
MCHC RBC-ENTMCNC: 31 PG — SIGNIFICANT CHANGE UP (ref 27–31)
MCHC RBC-ENTMCNC: 33.9 G/DL — SIGNIFICANT CHANGE UP (ref 32–37)
MCV RBC AUTO: 91.5 FL — SIGNIFICANT CHANGE UP (ref 81–99)
MONOCYTES # BLD AUTO: 1.03 K/UL — HIGH (ref 0.1–0.6)
MONOCYTES NFR BLD AUTO: 10.6 % — HIGH (ref 1.7–9.3)
NEUTROPHILS # BLD AUTO: 6.06 K/UL — SIGNIFICANT CHANGE UP (ref 1.4–6.5)
NEUTROPHILS NFR BLD AUTO: 62.3 % — SIGNIFICANT CHANGE UP (ref 42.2–75.2)
NRBC # BLD: 0 /100 WBCS — SIGNIFICANT CHANGE UP (ref 0–0)
PLATELET # BLD AUTO: 365 K/UL — SIGNIFICANT CHANGE UP (ref 130–400)
POTASSIUM SERPL-MCNC: 4.2 MMOL/L — SIGNIFICANT CHANGE UP (ref 3.5–5)
POTASSIUM SERPL-SCNC: 4.2 MMOL/L — SIGNIFICANT CHANGE UP (ref 3.5–5)
PROT SERPL-MCNC: 6.5 G/DL — SIGNIFICANT CHANGE UP (ref 6–8)
RBC # BLD: 4.35 M/UL — SIGNIFICANT CHANGE UP (ref 4.2–5.4)
RBC # FLD: 12.9 % — SIGNIFICANT CHANGE UP (ref 11.5–14.5)
SODIUM SERPL-SCNC: 142 MMOL/L — SIGNIFICANT CHANGE UP (ref 135–146)
WBC # BLD: 9.73 K/UL — SIGNIFICANT CHANGE UP (ref 4.8–10.8)
WBC # FLD AUTO: 9.73 K/UL — SIGNIFICANT CHANGE UP (ref 4.8–10.8)

## 2022-07-04 PROCEDURE — 99239 HOSP IP/OBS DSCHRG MGMT >30: CPT

## 2022-07-04 RX ORDER — LOSARTAN POTASSIUM 100 MG/1
50 TABLET, FILM COATED ORAL DAILY
Refills: 0 | Status: DISCONTINUED | OUTPATIENT
Start: 2022-07-04 | End: 2022-07-04

## 2022-07-04 RX ADMIN — SODIUM CHLORIDE 75 MILLILITER(S): 9 INJECTION INTRAMUSCULAR; INTRAVENOUS; SUBCUTANEOUS at 00:03

## 2022-07-04 NOTE — DISCHARGE NOTE PROVIDER - NSDCCPCAREPLAN_GEN_ALL_CORE_FT
PRINCIPAL DISCHARGE DIAGNOSIS  Diagnosis: Abdominal pain  Assessment and Plan of Treatment: Pt is admitted for workup/management of cholecystitis/suspected choledocholithiasis   MRCP was done Handoff: Acute inpatient management  required further which was found to have Cholelithiasis without evidence for cholecystitis or choledocholithiasis. No biliary ductal dilatation. Diffuse hepatic steatosis. Her LFT's also showed improvement and abdominal pain totally resolved. GI was on board who also cleared her for discharge.      SECONDARY DISCHARGE DIAGNOSES  Diagnosis: Dilation of common bile duct  Assessment and Plan of Treatment: MRCP did not show  biliary ductal dilatation

## 2022-07-04 NOTE — DISCHARGE NOTE PROVIDER - NSDCMRMEDTOKEN_GEN_ALL_CORE_FT
losartan 50 mg oral tablet: 1 tab(s) orally once a day  metFORMIN 500 mg oral tablet: 1 tab(s) orally once a day

## 2022-07-04 NOTE — PROGRESS NOTE ADULT - SUBJECTIVE AND OBJECTIVE BOX
Progress Note:  Provider Speciality                            Hospitalist      GABRIEL BAZZI MRN-[id number] 41y Female     CHIEF PRESENTING COMPLAINT:  Patient is a 41y old  Female who presents with a chief complaint of Abdominal pain (02 Jul 2022 11:49)        SUBJECTIVE:  Patient was seen and examined at bedside. Reports improvement in  presenting complaint.   No significant overnight events reported.     HISTORY OF PRESENTING ILLNESS:  HPI:  41F with medical history of L nephrectomy for kidney donation to her mother 17 y ago, pre diabetes, tested positive for COVID outpatient 2 weeks (mild infection with cough only did not need further medical care, vaccinated and boosted) ago presents with RUQ abdominal pain since last night. Pain is sharp/crampy, constant, no relieving or exacerbating factors, not related to food intake. She had the same pain on Wednesday, resolved by itself, then recurred last night and did not resolve until she received morphine in the hospital.     She denies history of gallbladder stones or similar pain, nausea, vomiting, fever, chills, pruritus, yellowing of the skin or eyes, back pain, diarrhea or constipation, chest pain, SOB, cough, drug or herbal supplement use.    ED VS: T(F): , Max: 98.2  HR:  (77 - 85) BP:  (129/82 - 166/77) RR: 18 SpO2:  (97% - 99%)    Labs: Alk phos 146, , , T bili 1.2, lipase 69. COVID PCR + RUQ US Cholelithiasis without sonographic evidence for acute cholecystitis. Dilated CBD 7 mm  CTAP noncon: CBD 8 mm, hepatic steatosis, s/p left nephrectomy, no acute pathology    Pt received LR bolus, morphine with resolution of pain  Pt is admitted for workup/management of cholecystitis/suspected choledocholithiasis    (02 Jul 2022 09:04)        REVIEW OF SYSTEMS:  Patient denies any headache, any vision complaints, runny nose, fever, chills. Denies chest pain, shortness of breath, palpitation. Denies nausea, vomiting, abdominal pain or diarrhoea, Denies dysuria. Denies  localized weakness in any part of the body or numbness.   At least 10 systems were reviewed in ROS. All systems reviewed  are within normal limits except for the complaints as described in Subjective.    PAST MEDICAL & SURGICAL HISTORY:  PAST MEDICAL & SURGICAL HISTORY:  S/p nephrectomy      HTN (hypertension)              VITAL SIGNS:  Vital Signs Last 24 Hrs  T(C): 37.2 (03 Jul 2022 08:00), Max: 37.2 (03 Jul 2022 08:00)  T(F): 99 (03 Jul 2022 08:00), Max: 99 (03 Jul 2022 08:00)  HR: 81 (03 Jul 2022 08:00) (72 - 84)  BP: 136/79 (03 Jul 2022 08:00) (122/83 - 169/86)  BP(mean): --  RR: 18 (03 Jul 2022 08:00) (18 - 18)  SpO2: 100% (03 Jul 2022 08:00) (98% - 100%)          PHYSICAL EXAMINATION:  Not in acute distress, obese  General: No icterus  HEENT:   no JVD.  Heart: S1+S2 audible  Lungs: bilateral  moderate air entry, no wheezing, no crepitations.  Abdomen: Soft, non-tender, non-distended , no  rigidity or guarding.  CNS: Awake alert, CN  grossly intact.  Extremities:  No edema            CONSULTS:  Consultant(s) Notes Reviewed by me.   Care Discussed with Consultants/Other Providers where required.        MEDICATIONS:  MEDICATIONS  (STANDING):  enoxaparin Injectable 40 milliGRAM(s) SubCutaneous every 24 hours  sodium chloride 0.9%. 1000 milliLiter(s) (75 mL/Hr) IV Continuous <Continuous>    MEDICATIONS  (PRN):  ibuprofen  Tablet. 400 milliGRAM(s) Oral every 6 hours PRN Moderate Pain (4 - 6)  ketorolac   Injectable 15 milliGRAM(s) IV Push every 6 hours PRN Severe Pain (7 - 10)  melatonin 3 milliGRAM(s) Oral at bedtime PRN Insomnia  ondansetron Injectable 4 milliGRAM(s) IV Push every 6 hours PRN Nausea and/or Vomiting            ASSESSMENT:  a41 F with medical history of L nephrectomy for kidney donation, pre diabetes, tested positive for COVID outpatient 2 weeks (mild infection with cough only did not need further medical care, vaccinated and boosted) ago presents with RUQ abdominal pain x1 day, found to have cholelithiasis, CBD dilation, and elevated liver enzymes.        Acute transaminitis possibly due to choledocholithiasis  Asymptomatic Covid  Hypertension  Obesity    CT suggestive of CBD dilatation to 8 mm. US abd shows Cholelithiasis without sonographic evidence for acute cholecystitis.pain control  Pending MRCP  Trend CMP  Hepatitis panel -unremarkable  Asymptomatic Covid- no need for medical therapy  HTN- Resume losartan 50 mg  Get lipid panel, HbA1c.  Hold metformin. Sliding scale coverage   Handoff: Pending MRCP    Total time spent to complete patient's bedside assessment, physical examination, review medical chart including labs & imaging, discuss medical plan of care with housestaff was more than 35 minutes        
  DAILY PROGRESS NOTE  ===========================================================    Patient Information:  GABRIEL BAZZI  /  41y  /  Female  /  MRN#: [id number]    Hospital Day: 2d     |:::::::::::::::::::::::::::| SUBJECTIVE |:::::::::::::::::::::::::::|    OVERNIGHT EVENTS:   TODAY: Patient was seen today at bedside. Review of systems is otherwise negative.    |:::::::::::::::::::::::::::| OBJECTIVE |:::::::::::::::::::::::::::|    VITAL SIGNS: Last 24 Hours  T(C): 36.4 (04 Jul 2022 08:52), Max: 36.8 (03 Jul 2022 23:10)  T(F): 97.6 (04 Jul 2022 08:52), Max: 98.3 (03 Jul 2022 23:10)  HR: 80 (04 Jul 2022 08:52) (78 - 89)  BP: 165/85 (04 Jul 2022 08:52) (104/71 - 165/85)  BP(mean): --  RR: 18 (04 Jul 2022 08:52) (18 - 18)  SpO2: 100% (04 Jul 2022 08:52) (100% - 100%)    07-03-22 @ 07:01  -  07-04-22 @ 07:00  --------------------------------------------------------  IN: 825 mL / OUT: 0 mL / NET: 825 mL    07-04-22 @ 07:01  -  07-04-22 @ 11:58  --------------------------------------------------------  IN: 225 mL / OUT: 0 mL / NET: 225 mL      PHYSICAL EXAM:  GENERAL:   Awake, alert; NAD.  HEENT:  Head NC/AT; Conjunctivae pink, Sclera anicteric; Oral mucosa moist.  CARDIO:   Regular rate; Regular rhythm  RESP:   No rales, wheezing, or rhonchi appreciated.  GI:   Soft; NT/ND; BS; No guarding; No rebound tenderness.  EXT:   No edema.   SKIN:   Intact.    LAB RESULTS:                        13.5   9.73  )-----------( 365      ( 04 Jul 2022 07:54 )             39.8     07-04    142  |  106  |  10  ----------------------------<  141<H>  4.2   |  25  |  0.7    Ca    9.3      04 Jul 2022 07:54  Mg     1.8     07-03    TPro  6.5  /  Alb  4.1  /  TBili  0.6  /  DBili  x   /  AST  61<H>  /  ALT  282<H>  /  AlkPhos  136<H>  07-04    PT/INR - ( 03 Jul 2022 06:54 )   PT: 17.30 sec;   INR: 1.51 ratio                     MICROBIOLOGY:    Culture - Urine (collected 02 Jul 2022 05:36)  Source: Clean Catch Clean Catch (Midstream)  Final Report (03 Jul 2022 12:44):    <10,000 CFU/mL Normal Urogenital Evelia      RADIOLOGY:    ALLERGIES:  No Known Allergies      ===========================================================

## 2022-07-04 NOTE — DISCHARGE NOTE NURSING/CASE MANAGEMENT/SOCIAL WORK - NSDCPEFALRISK_GEN_ALL_CORE
For information on Fall & Injury Prevention, visit: [email or web address] OR  [email or web address] OR  https://www.cdc.gov/steadi/patient.html

## 2022-07-04 NOTE — DISCHARGE NOTE PROVIDER - HOSPITAL COURSE
41F with medical history of L nephrectomy for kidney donation to her mother 17 y ago, pre diabetes, tested positive for COVID outpatient 2 weeks (mild infection with cough only did not need further medical care, vaccinated and boosted) ago presents with RUQ abdominal pain since last night. Pain is sharp/crampy, constant, no relieving or exacerbating factors, not related to food intake. She had the same pain on Wednesday, resolved by itself, then recurred last night and did not resolve until she received morphine in the hospital. She denies history of gallbladder stones or similar pain, nausea, vomiting, fever, chills, pruritus, yellowing of the skin or eyes, back pain, diarrhea or constipation, chest pain, SOB, cough, drug or herbal supplement use.  ED VS: T(F): , Max: 98.2  HR:  (77 - 85) BP:  (129/82 - 166/77) RR: 18 SpO2:  (97% - 99%)  Labs: Alk phos 146, , , T bili 1.2, lipase 69. COVID PCR + RUQ US Cholelithiasis without sonographic evidence for acute cholecystitis. Dilated CBD 7 mm  CTAP noncon: CBD 8 mm, hepatic steatosis, s/p left nephrectomy, no acute pathology  Pt received LR bolus, morphine with resolution of pain  Pt is admitted for workup/management of cholecystitis/suspected choledocholithiasis   MRCP was done inpatient which was found to have Cholelithiasis without evidence for cholecystitis or choledocholithiasis. No biliary ductal dilatation. Diffuse hepatic steatosis. Her LFT's also showed improvement and abdominal pain totally resolved. GI was on board who also cleared her for discharge. She was found to be Covid positive but she was asymptomatic , never required oxygen . She needs to continue isolation on discharge.  Attending Attestation:  Patient was seen & examined independently. At least 10 systems were reviewed in ROS. All systems reviewed  are within normal limits. Latest vital signs and labs were reviewed today. Case was discussed with house staff in morning rounds for assessment and plan.  Patient is medically stable for discharge . About 32 mins spent on discharge disposition.   41F with medical history of L nephrectomy for kidney donation to her mother 17 y ago, pre diabetes, tested positive for COVID outpatient 2 weeks (mild infection with cough only did not need further medical care, vaccinated and boosted) ago presents with RUQ abdominal pain since last night. Pain is sharp/crampy, constant, no relieving or exacerbating factors, not related to food intake. She had the same pain on Wednesday, resolved by itself, then recurred last night and did not resolve until she received morphine in the hospital. She denies history of gallbladder stones or similar pain, nausea, vomiting, fever, chills, pruritus, yellowing of the skin or eyes, back pain, diarrhea or constipation, chest pain, SOB, cough, drug or herbal supplement use.  COVID PCR +   RUQ US Cholelithiasis without sonographic evidence for acute cholecystitis. Dilated CBD 7 mm  CTAP non-con: CBD 8 mm, hepatic steatosis, s/p left nephrectomy, no acute pathology    Pt is admitted for workup/management of cholecystitis/suspected choledocholithiasis   MRCP was done inpatient which was found to have Cholelithiasis without evidence for cholecystitis or choledocholithiasis. No biliary ductal dilatation. Diffuse hepatic steatosis. Her LFT's also showed improvement and abdominal pain totally resolved. GI was on board who also cleared her for discharge. She was found to be Covid positive but she was asymptomatic , never required oxygen . She needs to continue isolation on discharge.  Attending Attestation:  Patient was seen & examined independently. At least 10 systems were reviewed in ROS. All systems reviewed  are within normal limits. Latest vital signs and labs were reviewed today. Case was discussed with house staff in morning rounds for assessment and plan.  Patient is medically stable for discharge . About 32 mins spent on discharge disposition.

## 2022-07-04 NOTE — DISCHARGE NOTE NURSING/CASE MANAGEMENT/SOCIAL WORK - PATIENT PORTAL LINK FT
You can access the FollowMyHealth Patient Portal offered by [facility] by registering at the following website: [email or web address]. By joining [employer]’s FollowMyHealth portal, you will also be able to view your health information using other applications (apps) compatible with our system.

## 2022-07-04 NOTE — PROGRESS NOTE ADULT - ASSESSMENT
41F with medical history of L nephrectomy for kidney donation, pre diabetes, tested positive for COVID outpatient 2 weeks (mild infection with cough only did not need further medical care, vaccinated and boosted) ago presents with RUQ abdominal pain x1 day, found to have cholelithiasis, CBD dilation, and elevated liver enzymes.    #Cholelithiasis, suspected choledocholithiasis  - RUQ US: Cholelithiasis, no cholecystitis  - CTAP noncon: CBD 8 mm, hepatic steatosis, s/p left nephrectomy, no acute pathology  - Afebrile, no leukocytosis No evidence of cholecystitis or cholangitis. Lipase mildly elevated, no evidence of gallstone pancreatitis  - MRCP performed, pending read   - NSAIDs PRN (currently pain free)  - GI consult noted     #Transaminitis - Improving   - Alk phos, ALT, AST elevation in a hepatocellular pattern; T bili normal  - Possibly component of choledocholithiasis + viral hepatitis (recent COVID infection)  - Viral hepatitis panel unremarkable  -GI consult noted       #COVID-19 infection, asymptomatic  - Likely resolved, tested positive 2 weeks ago  - No indication for treatment, monitor    #HTN  #Pre-DM  - Resume home losartan 50 mg  - If FS persistently elevated can start basal bolus insulin    #Hx of left nephrectomy- outpt f/u    DVT Prophylaxis: Lovenox   GI Prophylaxis: Protonix PO  Diet: NPO for now

## 2022-07-06 PROBLEM — Z00.00 ENCOUNTER FOR PREVENTIVE HEALTH EXAMINATION: Status: ACTIVE | Noted: 2022-07-06

## 2022-12-29 ENCOUNTER — EMERGENCY (EMERGENCY)
Facility: HOSPITAL | Age: 42
LOS: 0 days | Discharge: HOME | End: 2022-12-29
Attending: EMERGENCY MEDICINE | Admitting: EMERGENCY MEDICINE
Payer: COMMERCIAL

## 2022-12-29 VITALS
SYSTOLIC BLOOD PRESSURE: 156 MMHG | HEART RATE: 111 BPM | WEIGHT: 214.07 LBS | RESPIRATION RATE: 18 BRPM | OXYGEN SATURATION: 98 % | DIASTOLIC BLOOD PRESSURE: 82 MMHG | TEMPERATURE: 99 F

## 2022-12-29 VITALS
TEMPERATURE: 99 F | DIASTOLIC BLOOD PRESSURE: 71 MMHG | HEART RATE: 102 BPM | RESPIRATION RATE: 16 BRPM | SYSTOLIC BLOOD PRESSURE: 131 MMHG | OXYGEN SATURATION: 100 %

## 2022-12-29 DIAGNOSIS — Z20.822 CONTACT WITH AND (SUSPECTED) EXPOSURE TO COVID-19: ICD-10-CM

## 2022-12-29 DIAGNOSIS — R07.81 PLEURODYNIA: ICD-10-CM

## 2022-12-29 DIAGNOSIS — Z79.84 LONG TERM (CURRENT) USE OF ORAL HYPOGLYCEMIC DRUGS: ICD-10-CM

## 2022-12-29 DIAGNOSIS — M79.18 MYALGIA, OTHER SITE: ICD-10-CM

## 2022-12-29 DIAGNOSIS — J11.1 INFLUENZA DUE TO UNIDENTIFIED INFLUENZA VIRUS WITH OTHER RESPIRATORY MANIFESTATIONS: ICD-10-CM

## 2022-12-29 DIAGNOSIS — R07.89 OTHER CHEST PAIN: ICD-10-CM

## 2022-12-29 DIAGNOSIS — E11.9 TYPE 2 DIABETES MELLITUS WITHOUT COMPLICATIONS: ICD-10-CM

## 2022-12-29 DIAGNOSIS — I10 ESSENTIAL (PRIMARY) HYPERTENSION: ICD-10-CM

## 2022-12-29 DIAGNOSIS — R05.9 COUGH, UNSPECIFIED: ICD-10-CM

## 2022-12-29 DIAGNOSIS — R09.81 NASAL CONGESTION: ICD-10-CM

## 2022-12-29 LAB
ALBUMIN SERPL ELPH-MCNC: 4.7 G/DL — SIGNIFICANT CHANGE UP (ref 3.5–5.2)
ALP SERPL-CCNC: 64 U/L — SIGNIFICANT CHANGE UP (ref 30–115)
ALT FLD-CCNC: 42 U/L — HIGH (ref 0–41)
ANION GAP SERPL CALC-SCNC: 12 MMOL/L — SIGNIFICANT CHANGE UP (ref 7–14)
APPEARANCE UR: ABNORMAL
AST SERPL-CCNC: 56 U/L — HIGH (ref 0–41)
BACTERIA # UR AUTO: ABNORMAL
BASOPHILS # BLD AUTO: 0.03 K/UL — SIGNIFICANT CHANGE UP (ref 0–0.2)
BASOPHILS NFR BLD AUTO: 0.3 % — SIGNIFICANT CHANGE UP (ref 0–1)
BILIRUB SERPL-MCNC: 0.3 MG/DL — SIGNIFICANT CHANGE UP (ref 0.2–1.2)
BILIRUB UR-MCNC: NEGATIVE — SIGNIFICANT CHANGE UP
BUN SERPL-MCNC: 10 MG/DL — SIGNIFICANT CHANGE UP (ref 10–20)
CALCIUM SERPL-MCNC: 8.9 MG/DL — SIGNIFICANT CHANGE UP (ref 8.4–10.4)
CHLORIDE SERPL-SCNC: 99 MMOL/L — SIGNIFICANT CHANGE UP (ref 98–110)
CO2 SERPL-SCNC: 24 MMOL/L — SIGNIFICANT CHANGE UP (ref 17–32)
COLOR SPEC: YELLOW — SIGNIFICANT CHANGE UP
CREAT SERPL-MCNC: 0.7 MG/DL — SIGNIFICANT CHANGE UP (ref 0.7–1.5)
DIFF PNL FLD: ABNORMAL
EGFR: 110 ML/MIN/1.73M2 — SIGNIFICANT CHANGE UP
EOSINOPHIL # BLD AUTO: 0.03 K/UL — SIGNIFICANT CHANGE UP (ref 0–0.7)
EOSINOPHIL NFR BLD AUTO: 0.3 % — SIGNIFICANT CHANGE UP (ref 0–8)
EPI CELLS # UR: 4 /HPF — SIGNIFICANT CHANGE UP (ref 0–5)
FLUAV AG NPH QL: DETECTED
FLUBV AG NPH QL: SIGNIFICANT CHANGE UP
GLUCOSE SERPL-MCNC: 141 MG/DL — HIGH (ref 70–99)
GLUCOSE UR QL: NEGATIVE — SIGNIFICANT CHANGE UP
HCG SERPL QL: NEGATIVE — SIGNIFICANT CHANGE UP
HCT VFR BLD CALC: 36.7 % — LOW (ref 37–47)
HGB BLD-MCNC: 12.9 G/DL — SIGNIFICANT CHANGE UP (ref 12–16)
HYALINE CASTS # UR AUTO: 7 /LPF — SIGNIFICANT CHANGE UP (ref 0–7)
IMM GRANULOCYTES NFR BLD AUTO: 0.3 % — SIGNIFICANT CHANGE UP (ref 0.1–0.3)
KETONES UR-MCNC: NEGATIVE — SIGNIFICANT CHANGE UP
LEUKOCYTE ESTERASE UR-ACNC: NEGATIVE — SIGNIFICANT CHANGE UP
LIDOCAIN IGE QN: 36 U/L — SIGNIFICANT CHANGE UP (ref 7–60)
LYMPHOCYTES # BLD AUTO: 0.48 K/UL — LOW (ref 1.2–3.4)
LYMPHOCYTES # BLD AUTO: 5.2 % — LOW (ref 20.5–51.1)
MCHC RBC-ENTMCNC: 31.9 PG — HIGH (ref 27–31)
MCHC RBC-ENTMCNC: 35.1 G/DL — SIGNIFICANT CHANGE UP (ref 32–37)
MCV RBC AUTO: 90.8 FL — SIGNIFICANT CHANGE UP (ref 81–99)
MONOCYTES # BLD AUTO: 0.85 K/UL — HIGH (ref 0.1–0.6)
MONOCYTES NFR BLD AUTO: 9.2 % — SIGNIFICANT CHANGE UP (ref 1.7–9.3)
NEUTROPHILS # BLD AUTO: 7.77 K/UL — HIGH (ref 1.4–6.5)
NEUTROPHILS NFR BLD AUTO: 84.7 % — HIGH (ref 42.2–75.2)
NITRITE UR-MCNC: NEGATIVE — SIGNIFICANT CHANGE UP
NRBC # BLD: 0 /100 WBCS — SIGNIFICANT CHANGE UP (ref 0–0)
PH UR: 5.5 — SIGNIFICANT CHANGE UP (ref 5–8)
PLATELET # BLD AUTO: 270 K/UL — SIGNIFICANT CHANGE UP (ref 130–400)
POTASSIUM SERPL-MCNC: 4.1 MMOL/L — SIGNIFICANT CHANGE UP (ref 3.5–5)
POTASSIUM SERPL-SCNC: 4.1 MMOL/L — SIGNIFICANT CHANGE UP (ref 3.5–5)
PROT SERPL-MCNC: 7.1 G/DL — SIGNIFICANT CHANGE UP (ref 6–8)
PROT UR-MCNC: NEGATIVE — SIGNIFICANT CHANGE UP
RBC # BLD: 4.04 M/UL — LOW (ref 4.2–5.4)
RBC # FLD: 12.2 % — SIGNIFICANT CHANGE UP (ref 11.5–14.5)
RBC CASTS # UR COMP ASSIST: 6 /HPF — HIGH (ref 0–4)
RSV RNA NPH QL NAA+NON-PROBE: SIGNIFICANT CHANGE UP
SARS-COV-2 RNA SPEC QL NAA+PROBE: SIGNIFICANT CHANGE UP
SODIUM SERPL-SCNC: 135 MMOL/L — SIGNIFICANT CHANGE UP (ref 135–146)
SP GR SPEC: 1.01 — SIGNIFICANT CHANGE UP (ref 1.01–1.03)
TROPONIN T SERPL-MCNC: <0.01 NG/ML — SIGNIFICANT CHANGE UP
UROBILINOGEN FLD QL: SIGNIFICANT CHANGE UP
WBC # BLD: 9.19 K/UL — SIGNIFICANT CHANGE UP (ref 4.8–10.8)
WBC # FLD AUTO: 9.19 K/UL — SIGNIFICANT CHANGE UP (ref 4.8–10.8)
WBC UR QL: 11 /HPF — HIGH (ref 0–5)

## 2022-12-29 PROCEDURE — 74176 CT ABD & PELVIS W/O CONTRAST: CPT | Mod: 26,MA

## 2022-12-29 PROCEDURE — 99285 EMERGENCY DEPT VISIT HI MDM: CPT

## 2022-12-29 PROCEDURE — 93010 ELECTROCARDIOGRAM REPORT: CPT

## 2022-12-29 PROCEDURE — 71045 X-RAY EXAM CHEST 1 VIEW: CPT | Mod: 26

## 2022-12-29 RX ORDER — SODIUM CHLORIDE 9 MG/ML
1000 INJECTION INTRAMUSCULAR; INTRAVENOUS; SUBCUTANEOUS ONCE
Refills: 0 | Status: COMPLETED | OUTPATIENT
Start: 2022-12-29 | End: 2022-12-29

## 2022-12-29 RX ORDER — ACETAMINOPHEN 500 MG
975 TABLET ORAL ONCE
Refills: 0 | Status: COMPLETED | OUTPATIENT
Start: 2022-12-29 | End: 2022-12-29

## 2022-12-29 RX ADMIN — SODIUM CHLORIDE 1000 MILLILITER(S): 9 INJECTION INTRAMUSCULAR; INTRAVENOUS; SUBCUTANEOUS at 18:27

## 2022-12-29 RX ADMIN — SODIUM CHLORIDE 1000 MILLILITER(S): 9 INJECTION INTRAMUSCULAR; INTRAVENOUS; SUBCUTANEOUS at 20:21

## 2022-12-29 RX ADMIN — SODIUM CHLORIDE 1000 MILLILITER(S): 9 INJECTION INTRAMUSCULAR; INTRAVENOUS; SUBCUTANEOUS at 17:22

## 2022-12-29 RX ADMIN — Medication 975 MILLIGRAM(S): at 17:22

## 2022-12-29 NOTE — ED PROVIDER NOTE - PHYSICAL EXAMINATION
CONSTITUTIONAL: Well-appearing; well-nourished; in no apparent distress.   NECK: Supple; non-tender; no cervical lymphadenopathy.   CARDIOVASCULAR: Normal S1, S2; no murmurs, rubs, or gallops.   RESPIRATORY: Normal chest excursion with respiration; breath sounds clear and equal bilaterally; no wheezes, rhonchi, or rales.  GI/: Normal bowel sounds; non-distended; non-tender; no palpable organomegaly.   MS: No evidence of trauma or deformity. Non-tender to palpation. No CVA tenderness. Normal ROM in all four extremities; non-tender to palpation; distal pulses are normal.   SKIN: Normal for age and race; warm; dry; good turgor; no apparent lesions or exudate.   NEURO/PSYCH: A & O x 4; grossly unremarkable. mood and manner are appropriate.

## 2022-12-29 NOTE — ED PROVIDER NOTE - NS ED ROS FT
Constitutional: no fever, chills, no recent weight loss, change in appetite or malaise  Eyes: no redness/discharge/pain/vision changes  ENT: no rhinorrhea/ear pain/sore throat  Cardiac: No SOB or edema.  Respiratory: No respiratory distress  GI: No nausea, vomiting, diarrhea or abdominal pain.  : No dysuria, frequency, urgency or hematuria  MS: no pain to extremities, no loss of ROM, no weakness  Neuro: No headache or weakness. No LOC.  Skin: No skin rash.  Except as documented in the HPI, all other systems are negative.

## 2022-12-29 NOTE — ED PROVIDER NOTE - PROGRESS NOTE DETAILS
Sx are viral at present. Educated on difference between bacterial vs viral infections and risks of unnecesary abx.  Recommend if no improvement or worsening such as fever, malaise, sx persisting > 1 week or any purulence to discharge, return to er or f/u with PCP for re-evaluation.

## 2022-12-29 NOTE — ED PROVIDER NOTE - ATTENDING APP SHARED VISIT CONTRIBUTION OF CARE
43 yo f with pmh of htn and dm, presents with L lower rib pain radiating to the mid chest, mild cough, congestion and bodyaches.  no abd pain, no n/v/d.  says pain strarted today.  no sob.  no leg pain or swelling.  exam: nad, ncat, perrl, eomi, mmm, rrr, ctab, abd soft, nt, nd aox3, no calf tenderness, no pitting edema, warm to touch imp: pt with fever here and uri sx with chest pain, will check labs, cxr and ekg.  pt likely viral syndrome,

## 2022-12-29 NOTE — ED PROVIDER NOTE - NS ED ATTENDING STATEMENT MOD
(3) walks occasionally This was a shared visit with the ABHINAV. I reviewed and verified the documentation and independently performed the documented:

## 2022-12-29 NOTE — ED ADULT NURSE NOTE - OBJECTIVE STATEMENT
Pt presents to the ed c/o chest pain x today at rest. Pt denies current chest pain and was noted to be tachycardic and febrile. No abdominal tenderness noted on palpation. Pt denies dysuria or hematuria.

## 2022-12-29 NOTE — ED PROVIDER NOTE - OBJECTIVE STATEMENT
pt with pmhx htn and dm, presents to ED c/o chest wall pain- L lower rib pain radiating to the mid chest, mild cough, congestion and bodyaches started today. pain is sharp, moderate. denies exacerbating or relieving factors. Denies fever/chill/HA/dizziness/chest pain/palpitation/sob/abd pain/n/v/d/ black stool/bloody stool/urinary sxs

## 2022-12-29 NOTE — ED ADULT NURSE NOTE - NS ED NURSE LEVEL OF CONSCIOUSNESS ORIENTATION
Oriented - self; Oriented - place; Oriented - time The resident's documentation has been prepared under my direction and personally reviewed by me in its entirety. I confirm that the note above accurately reflects all work, treatment, procedures, and medical decision making performed by me.  Melodie Lester MD.

## 2022-12-30 PROBLEM — I10 ESSENTIAL (PRIMARY) HYPERTENSION: Chronic | Status: ACTIVE | Noted: 2022-07-02

## 2022-12-30 PROBLEM — Z90.5 ACQUIRED ABSENCE OF KIDNEY: Chronic | Status: ACTIVE | Noted: 2022-07-02

## 2022-12-30 LAB
CULTURE RESULTS: SIGNIFICANT CHANGE UP
SPECIMEN SOURCE: SIGNIFICANT CHANGE UP

## 2023-03-15 NOTE — ED ADULT TRIAGE NOTE - BP NONINVASIVE SYSTOLIC (MM HG)
Left voice message regarding missed appointment. Encouraged to call the Clinic and reschedule.     156

## 2024-02-10 NOTE — ED PROVIDER NOTE - NSFOLLOWUPINSTRUCTIONS_ED_ALL_ED_FT
Abdominal Pain, Adult  Abdominal pain can be caused by many things. Often, abdominal pain is not serious and it gets better with no treatment or by being treated at home. However, sometimes abdominal pain is serious. Your health care provider will do a medical history and a physical exam to try to determine the cause of your abdominal pain.    Follow these instructions at home:  Take over-the-counter and prescription medicines only as told by your health care provider. Do not take a laxative unless told by your health care provider.  ImageDrink enough fluid to keep your urine clear or pale yellow.  Watch your condition for any changes.  Keep all follow-up visits as told by your health care provider. This is important.  Contact a health care provider if:  Your abdominal pain changes or gets worse.  You are not hungry or you lose weight without trying.  You are constipated or have diarrhea for more than 2–3 days.  You have pain when you urinate or have a bowel movement.  Your abdominal pain wakes you up at night.  Your pain gets worse with meals, after eating, or with certain foods.  You are throwing up and cannot keep anything down.  You have a fever.  Get help right away if:  Your pain does not go away as soon as your health care provider told you to expect.  You cannot stop throwing up.  Your pain is only in areas of the abdomen, such as the right side or the left lower portion of the abdomen.  You have bloody or black stools, or stools that look like tar.  You have severe pain, cramping, or bloating in your abdomen.  You have signs of dehydration, such as:    Dark urine, very little urine, or no urine.  Cracked lips.  Dry mouth.  Sunken eyes.  Sleepiness.  Weakness.    This information is not intended to replace advice given to you by your health care provider. Make sure you discuss any questions you have with your health care provider  Fever, Adult    A fever is an increase in the body's temperature. It is usually defined as a temperature of 100.4°F (38°C) or higher. Brief mild or moderate fevers generally have no long-term effects, and they often do not require treatment. Moderate or high fevers may make you feel uncomfortable and can sometimes be a sign of a serious illness or disease. The sweating that may occur with repeated or prolonged fever may also cause dehydration.    Fever is confirmed by taking a temperature with a thermometer. A measured temperature can vary with:    Age.  Time of day.  Location of the thermometer:  Mouth (oral).  Rectum (rectal).  Ear (tympanic).  Underarm (axillary).  Forehead (temporal).    HOME CARE INSTRUCTIONS  Pay attention to any changes in your symptoms. Take these actions to help with your condition:    Take over-the counter and prescription medicines only as told by your health care provider. Follow the dosing instructions carefully.  If you were prescribed an antibiotic medicine, take it as told by your health care provider. Do not stop taking the antibiotic even if you start to feel better.  Rest as needed.  Drink enough fluid to keep your urine clear or pale yellow. This helps to prevent dehydration.  Sponge yourself or bathe with room-temperature water to help reduce your body temperature as needed. Do not use ice water.   Do not overbundle yourself in blankets or heavy clothes.    SEEK MEDICAL CARE IF:  You cannot eat or drink without vomiting.  You have pain when you urinate.  Your symptoms do not improve with treatment.  You develop new symptoms.  You develop excessive weakness.    SEEK IMMEDIATE MEDICAL CARE IF:  You have shortness of breath or have trouble breathing.  You are dizzy or you faint.  You are disoriented or confused.  You develop signs of dehydration, such as a dry mouth, decreased urination, or paleness.  You develop severe pain in your abdomen.  You have persistent vomiting or diarrhea.  You develop a skin rash.  Your symptoms suddenly get worse.    ADDITIONAL NOTES AND INSTRUCTIONS    Please follow up with your Primary MD in 24-48 hr.  Seek immediate medical care for any new/worsening signs or symptoms No

## 2024-03-25 ENCOUNTER — APPOINTMENT (OUTPATIENT)
Dept: OBGYN | Facility: CLINIC | Age: 44
End: 2024-03-25

## 2024-09-16 ENCOUNTER — OUTPATIENT (OUTPATIENT)
Dept: OUTPATIENT SERVICES | Facility: HOSPITAL | Age: 44
LOS: 1 days | End: 2024-09-16
Payer: MEDICAID

## 2024-09-16 DIAGNOSIS — Z12.31 ENCOUNTER FOR SCREENING MAMMOGRAM FOR MALIGNANT NEOPLASM OF BREAST: ICD-10-CM

## 2024-09-16 DIAGNOSIS — R92.2 INCONCLUSIVE MAMMOGRAM: ICD-10-CM

## 2024-09-16 PROCEDURE — 77063 BREAST TOMOSYNTHESIS BI: CPT

## 2024-09-16 PROCEDURE — 76641 ULTRASOUND BREAST COMPLETE: CPT | Mod: 50

## 2024-09-16 PROCEDURE — 76641 ULTRASOUND BREAST COMPLETE: CPT | Mod: 26,50

## 2024-09-16 PROCEDURE — 77067 SCR MAMMO BI INCL CAD: CPT | Mod: 26

## 2024-09-16 PROCEDURE — 77067 SCR MAMMO BI INCL CAD: CPT

## 2024-09-16 PROCEDURE — 77063 BREAST TOMOSYNTHESIS BI: CPT | Mod: 26

## 2024-09-17 DIAGNOSIS — R92.2 INCONCLUSIVE MAMMOGRAM: ICD-10-CM

## 2024-09-17 DIAGNOSIS — Z12.31 ENCOUNTER FOR SCREENING MAMMOGRAM FOR MALIGNANT NEOPLASM OF BREAST: ICD-10-CM

## 2025-03-07 ENCOUNTER — EMERGENCY (EMERGENCY)
Facility: HOSPITAL | Age: 45
LOS: 0 days | Discharge: HOME | End: 2025-03-08
Attending: EMERGENCY MEDICINE
Payer: MEDICAID

## 2025-03-07 VITALS
DIASTOLIC BLOOD PRESSURE: 75 MMHG | HEIGHT: 65 IN | WEIGHT: 173.06 LBS | TEMPERATURE: 98 F | SYSTOLIC BLOOD PRESSURE: 113 MMHG | RESPIRATION RATE: 19 BRPM | HEART RATE: 65 BPM | OXYGEN SATURATION: 100 %

## 2025-03-07 PROCEDURE — 99284 EMERGENCY DEPT VISIT MOD MDM: CPT | Mod: 25

## 2025-03-07 PROCEDURE — 85025 COMPLETE CBC W/AUTO DIFF WBC: CPT

## 2025-03-07 PROCEDURE — 83690 ASSAY OF LIPASE: CPT

## 2025-03-07 PROCEDURE — 99284 EMERGENCY DEPT VISIT MOD MDM: CPT

## 2025-03-07 PROCEDURE — 93005 ELECTROCARDIOGRAM TRACING: CPT

## 2025-03-07 PROCEDURE — 96374 THER/PROPH/DIAG INJ IV PUSH: CPT

## 2025-03-07 PROCEDURE — 80053 COMPREHEN METABOLIC PANEL: CPT

## 2025-03-07 PROCEDURE — 36415 COLL VENOUS BLD VENIPUNCTURE: CPT

## 2025-03-07 PROCEDURE — 84703 CHORIONIC GONADOTROPIN ASSAY: CPT

## 2025-03-07 NOTE — ED ADULT TRIAGE NOTE - CHIEF COMPLAINT QUOTE
BIBA with complaints of abdominal pain with vomiting and diarrhea since this afternoon, now feeling tired and weak.

## 2025-03-07 NOTE — ED ADULT NURSE NOTE - NSFALLUNIVINTERV_ED_ALL_ED
Bed/Stretcher in lowest position, wheels locked, appropriate side rails in place/Call bell, personal items and telephone in reach/Instruct patient to call for assistance before getting out of bed/chair/stretcher/Non-slip footwear applied when patient is off stretcher/[place] to call system/Physically safe environment - no spills, clutter or unnecessary equipment/Purposeful proactive rounding/Room/bathroom lighting operational, light cord in reach

## 2025-03-08 LAB
ALBUMIN SERPL ELPH-MCNC: 4.3 G/DL — SIGNIFICANT CHANGE UP (ref 3.5–5.2)
ALP SERPL-CCNC: 63 U/L — SIGNIFICANT CHANGE UP (ref 30–115)
ALT FLD-CCNC: 15 U/L — SIGNIFICANT CHANGE UP (ref 0–41)
ANION GAP SERPL CALC-SCNC: 18 MMOL/L — HIGH (ref 7–14)
AST SERPL-CCNC: 20 U/L — SIGNIFICANT CHANGE UP (ref 0–41)
BASOPHILS # BLD AUTO: 0.04 K/UL — SIGNIFICANT CHANGE UP (ref 0–0.2)
BASOPHILS NFR BLD AUTO: 0.2 % — SIGNIFICANT CHANGE UP (ref 0–1)
BILIRUB SERPL-MCNC: 0.9 MG/DL — SIGNIFICANT CHANGE UP (ref 0.2–1.2)
BUN SERPL-MCNC: 16 MG/DL — SIGNIFICANT CHANGE UP (ref 10–20)
CALCIUM SERPL-MCNC: 8.5 MG/DL — SIGNIFICANT CHANGE UP (ref 8.4–10.5)
CHLORIDE SERPL-SCNC: 107 MMOL/L — SIGNIFICANT CHANGE UP (ref 98–110)
CO2 SERPL-SCNC: 16 MMOL/L — LOW (ref 17–32)
CREAT SERPL-MCNC: 0.9 MG/DL — SIGNIFICANT CHANGE UP (ref 0.7–1.5)
EGFR: 80 ML/MIN/1.73M2 — SIGNIFICANT CHANGE UP
EOSINOPHIL # BLD AUTO: 0 K/UL — SIGNIFICANT CHANGE UP (ref 0–0.7)
EOSINOPHIL NFR BLD AUTO: 0 % — SIGNIFICANT CHANGE UP (ref 0–8)
GLUCOSE SERPL-MCNC: 155 MG/DL — HIGH (ref 70–99)
HCG SERPL QL: NEGATIVE — SIGNIFICANT CHANGE UP
HCT VFR BLD CALC: 44.4 % — SIGNIFICANT CHANGE UP (ref 37–47)
HGB BLD-MCNC: 15.1 G/DL — SIGNIFICANT CHANGE UP (ref 12–16)
IMM GRANULOCYTES NFR BLD AUTO: 0.5 % — HIGH (ref 0.1–0.3)
LIDOCAIN IGE QN: 40 U/L — SIGNIFICANT CHANGE UP (ref 7–60)
LYMPHOCYTES # BLD AUTO: 0.38 K/UL — LOW (ref 1.2–3.4)
LYMPHOCYTES # BLD AUTO: 2.3 % — LOW (ref 20.5–51.1)
MCHC RBC-ENTMCNC: 32.4 PG — HIGH (ref 27–31)
MCHC RBC-ENTMCNC: 34 G/DL — SIGNIFICANT CHANGE UP (ref 32–37)
MCV RBC AUTO: 95.3 FL — SIGNIFICANT CHANGE UP (ref 81–99)
MONOCYTES # BLD AUTO: 0.54 K/UL — SIGNIFICANT CHANGE UP (ref 0.1–0.6)
MONOCYTES NFR BLD AUTO: 3.3 % — SIGNIFICANT CHANGE UP (ref 1.7–9.3)
NEUTROPHILS # BLD AUTO: 15.5 K/UL — HIGH (ref 1.4–6.5)
NEUTROPHILS NFR BLD AUTO: 93.7 % — HIGH (ref 42.2–75.2)
NRBC BLD AUTO-RTO: 0 /100 WBCS — SIGNIFICANT CHANGE UP (ref 0–0)
PLATELET # BLD AUTO: 331 K/UL — SIGNIFICANT CHANGE UP (ref 130–400)
PMV BLD: 9 FL — SIGNIFICANT CHANGE UP (ref 7.4–10.4)
POTASSIUM SERPL-MCNC: 4.7 MMOL/L — SIGNIFICANT CHANGE UP (ref 3.5–5)
POTASSIUM SERPL-SCNC: 4.7 MMOL/L — SIGNIFICANT CHANGE UP (ref 3.5–5)
PROT SERPL-MCNC: 6.4 G/DL — SIGNIFICANT CHANGE UP (ref 6–8)
RBC # BLD: 4.66 M/UL — SIGNIFICANT CHANGE UP (ref 4.2–5.4)
RBC # FLD: 12.8 % — SIGNIFICANT CHANGE UP (ref 11.5–14.5)
SODIUM SERPL-SCNC: 141 MMOL/L — SIGNIFICANT CHANGE UP (ref 135–146)
WBC # BLD: 16.54 K/UL — HIGH (ref 4.8–10.8)
WBC # FLD AUTO: 16.54 K/UL — HIGH (ref 4.8–10.8)

## 2025-03-08 PROCEDURE — 93010 ELECTROCARDIOGRAM REPORT: CPT

## 2025-03-08 RX ORDER — ONDANSETRON HCL/PF 4 MG/2 ML
4 VIAL (ML) INJECTION ONCE
Refills: 0 | Status: COMPLETED | OUTPATIENT
Start: 2025-03-08 | End: 2025-03-08

## 2025-03-08 RX ADMIN — Medication 1000 MILLILITER(S): at 01:03

## 2025-03-08 RX ADMIN — Medication 4 MILLIGRAM(S): at 01:03

## 2025-03-08 NOTE — ED PROVIDER NOTE - NSFOLLOWUPINSTRUCTIONS_ED_ALL_ED_FT
Acute Nausea and Vomiting    WHAT YOU NEED TO KNOW:    Acute nausea and vomiting start suddenly, worsen quickly, and last a short time.    DISCHARGE INSTRUCTIONS:    Return to the emergency department if:     You see blood in your vomit or your bowel movements.      You have sudden, severe pain in your chest and upper abdomen after hard vomiting or retching.      You have swelling in your neck and chest.       You are dizzy, cold, and thirsty and your eyes and mouth are dry.      You are urinating very little or not at all.      You have muscle weakness, leg cramps, and trouble breathing.       Your heart is beating much faster than normal.       You continue to vomit for more than 48 hours.     Contact your healthcare provider if:     You have frequent dry heaves (vomiting but nothing comes out).      Your nausea and vomiting does not get better or go away after you use medicine.      You have questions or concerns about your condition or treatment.    Medicines: You may need any of the following:     Medicines may be given to calm your stomach and stop your vomiting. You may also need medicines to help you feel more relaxed or to stop nausea and vomiting caused by motion sickness.      Gastrointestinal stimulants are used to help empty your stomach and bowels. This may help decrease nausea and vomiting.      Take your medicine as directed. Contact your healthcare provider if you think your medicine is not helping or if you have side effects. Tell him or her if you are allergic to any medicine. Keep a list of the medicines, vitamins, and herbs you take. Include the amounts, and when and why you take them. Bring the list or the pill bottles to follow-up visits. Carry your medicine list with you in case of an emergency.    Prevent or manage acute nausea and vomiting:     Do not drink alcohol. Alcohol may upset or irritate your stomach. Too much alcohol can also cause acute nausea and vomiting.      Control stress. Headaches due to stress may cause nausea and vomiting. Find ways to relax and manage your stress. Get more rest and sleep.      Drink more liquids as directed. Vomiting can lead to dehydration. It is important to drink more liquids to help replace lost body fluids. Ask your healthcare provider how much liquid to drink each day and which liquids are best for you. Your provider may recommend that you drink an oral rehydration solution (ORS). ORS contains water, salts, and sugar that are needed to replace the lost body fluids. Ask what kind of ORS to use, how much to drink, and where to get it.      Eat smaller meals, more often. Eat small amounts of food every 2 to 3 hours, even if you are not hungry. Food in your stomach may decrease your nausea.      Talk to your healthcare provider before you take over-the-counter (OTC) medicines. These medicines can cause serious problems if you use certain other medicines, or you have a medical condition. You may have problems if you use too much or use them for longer than the label says. Follow directions on the label carefully.     Follow up with your healthcare provider as directed: Write down your questions so you remember to ask them during your follow-up visits.       © Copyright [employer] 2019 All illustrations and images included in CareNotes are the copyrighted property of [employer] or [employer].    Leukocytosis    WHAT YOU NEED TO KNOW:    Leukocytosis is a condition that causes you to have too many white blood cells (WBC). WBCs are part of your immune system and help fight infections and diseases.     DISCHARGE INSTRUCTIONS:    Medicines:     Medicines may be given to decrease inflammation or treat an infection. You may also be given medicine to decrease acid levels in your body or urine.      Take your medicine as directed. Contact your healthcare provider if you think your medicine is not helping or if you have side effects. Tell him of her if you are allergic to any medicine. Keep a list of the medicines, vitamins, and herbs you take. Include the amounts, and when and why you take them. Bring the list or the pill bottles to follow-up visits. Carry your medicine list with you in case of an emergency.    Follow up with your healthcare provider as directed: You may need more tests or treatment. You may also be referred to a specialist. Write down your questions so you remember to ask them during your visits.    Do not smoke: If you smoke, it is never too late to quit. Ask for information about how to stop smoking if you need help.    Contact your healthcare provider or specialist if:     You have a fever.       You bruise or bleed easily.       You are nauseated, lose weight without trying, or have a poor appetite.       You feel weak, tired, or sick.       You are a man and you have a painful erection that lasts longer than usual.       You have questions or concerns about your condition or care.     Return to the emergency department if:     You have any of the following signs of a stroke:   Part of your face droops or is numb      Weakness in an arm or leg      Confusion or difficulty speaking      Dizziness, a severe headache, or vision loss      You have chest pain or trouble breathing.      You have bleeding that does not stop.      You have new pain or tingling in your arms, legs, or abdomen.      You have sudden back pain.         © Copyright [employer] 2019 All illustrations and images included in CareNotes are the copyrighted property of [employer] or [employer].

## 2025-03-08 NOTE — ED PROVIDER NOTE - CLINICAL SUMMARY MEDICAL DECISION MAKING FREE TEXT BOX
Patient evaluated for vomiting, diarrhea and abdominal discomfort, labs reviewed and patient treated with medication with improvement.  Patient reported symptoms resolved and feels well to go home.  Advised to follow-up closely with PMD and patient agreed.  Patient requesting discharge stating she will follow-up closely; strict return precautions advised and patient verbalized understanding.

## 2025-03-08 NOTE — ED PROVIDER NOTE - PROGRESS NOTE DETAILS
pt reassessed, no abdominal pain. feeling better. abdomen soft, nontender. discussed elevated WBC, will follow up with her PMD. given return precautions

## 2025-03-08 NOTE — ED PROVIDER NOTE - PATIENT PORTAL LINK FT
You can access the FollowMyHealth Patient Portal offered by [facility] by registering at the following website: [email or web address]. By joining [email or web address]’s FollowMyHealth portal, you will also be able to view your health information using other applications (apps) compatible with our system.

## 2025-03-08 NOTE — ED PROVIDER NOTE - CARE PROVIDERS DIRECT ADDRESSES
[email or web address]

## 2025-03-08 NOTE — ED PROVIDER NOTE - PHYSICAL EXAMINATION
GENERAL: Well-nourished, Well-developed. NAD.  HEAD: NCAT  ENMT: MMM.   CVS: Normal S1,S2. No murmurs appreciated on auscultation   RESP: No use of accessory muscles. Chest rise symmetrical with good expansion. Lungs clear to auscultation B/L. No wheezing, rales, or rhonchi auscultated.  GI: Normal auscultation of bowel sounds in all 4 quadrants. Soft, Nontender, Nondistended. No guarding or rebound tenderness. No CVAT B/L.  Skin: Warm, Dry. No rashes or lesions. Good cap refill < 2 sec B/L.  EXT: Radial and pedal pulses present B/L. No calf tenderness or swelling B/L. No palpable cords. No pedal edema B/L.

## 2025-03-08 NOTE — ED PROVIDER NOTE - CARE PROVIDER_API CALL
Theodore Garciarey  Internal Medicine  [address] [place] [id number]  Phone: [phone]  Fax: [phone]  Follow Up Time: 1-3 Days

## 2025-03-08 NOTE — ED PROVIDER NOTE - ATTENDING APP SHARED VISIT CONTRIBUTION OF CARE
44-year-old female presents for evaluation of nonbilious nonbloody vomiting occurred earlier this evening.  Patient reports multiple episodes, while in bathroom with sister was feeling dizzy and had near syncopal episode.  Also developed watery nonbloody diarrhea with several episodes.  States she felt discomfort in her belly and then vomited and now feels better.  No fevers or chills, no known sick contacts or recent travel.  Had dinner with sisters and neither are sick.  Denies any chest pain, palpitations, shortness of breath, dizziness, headache, focal weakness.  Received IV fluids via EMS and is now feeling much better with resolution of symptoms.    VITAL SIGNS: noted  CONSTITUTIONAL: Well-developed; well-nourished; in no acute distress  HEAD: Normocephalic; atraumatic  EYES: PERRL, EOM intact; conjunctiva and sclera clear  ENT: No nasal discharge; airway clear. MMM  NECK: Supple; non tender.    CARD: S1, S2 normal; no murmurs, gallops, or rubs. Regular rate and rhythm  RESP: CTAB/L, no wheezes, rales or rhonchi  ABD: Normal bowel sounds; soft; non-distended; non-tender; no CVA tenderness  EXT: Normal ROM. No calf tenderness or edema. Distal pulses intact  NEURO: Alert, oriented. Grossly unremarkable. No focal deficits  SKIN: Skin exam is warm and dry, no acute rash  MS: No midline spinal tenderness

## 2025-03-08 NOTE — ED PROVIDER NOTE - OBJECTIVE STATEMENT
43 yo F pmhx htn presenting to the ED for evaluation of nausea, NBNB vomiting and diarrhea x 1 day. Pt states she has had multiple episodes of vomiting and nonbloody, watery diarrhea. pt endorsing she felt very weak while on toilet bowl and felt like she was going to pass out, witnessed by sister who states pt never had syncopal episode, no loc. pt states she received IV fluids from EMS and feels better. denies any fever, chills, abd pain, chest pain, sob.

## 2025-03-28 NOTE — ED ADULT NURSE NOTE - CHIEF COMPLAINT QUOTE
Pt admitted to 538-2.  Vitals recorded.  Pt is no tele at this time.  Pt with no complaints at this time.  Pt aware that she is npo for procedure on 3/28/25.  Alerted listed primary, Dr. Cunningham to pt presence.  Provider informed that Dr Esposito did not call to inform that pt would be admitted.  Home medications reviewed.  Orders to resume home meds given.  Labs ordered for am.  Dr. Cunningham informed that he wanted to to see pt in AM prior to giving additional orders.  Wounds present to BL feet at time of admission.  Wounds documented in flowsheet.   BIBA with complaints of abdominal pain with vomiting and diarrhea since this afternoon, now feeling tired and weak.

## 2025-04-24 NOTE — ED ADULT NURSE NOTE - DOES PATIENT HAVE ADVANCE DIRECTIVE
The patient has been examined and the H&P has been reviewed:    I concur with the findings and no changes have occurred since H&P was written.    Anesthesia/Surgery risks, benefits and alternative options discussed and understood by patient/family.          There are no hospital problems to display for this patient.     No

## 2025-06-18 ENCOUNTER — NON-APPOINTMENT (OUTPATIENT)
Age: 45
End: 2025-06-18